# Patient Record
Sex: MALE | Race: WHITE | Employment: OTHER | URBAN - NONMETROPOLITAN AREA
[De-identification: names, ages, dates, MRNs, and addresses within clinical notes are randomized per-mention and may not be internally consistent; named-entity substitution may affect disease eponyms.]

---

## 2017-01-12 ENCOUNTER — OFFICE VISIT (OUTPATIENT)
Dept: PULMONOLOGY | Age: 62
End: 2017-01-12

## 2017-01-12 VITALS
HEART RATE: 73 BPM | BODY MASS INDEX: 37.65 KG/M2 | OXYGEN SATURATION: 93 % | HEIGHT: 74 IN | SYSTOLIC BLOOD PRESSURE: 130 MMHG | WEIGHT: 293.4 LBS | DIASTOLIC BLOOD PRESSURE: 80 MMHG

## 2017-01-12 DIAGNOSIS — Z99.89 OSA ON CPAP: Primary | ICD-10-CM

## 2017-01-12 DIAGNOSIS — G47.33 OSA ON CPAP: Primary | ICD-10-CM

## 2017-01-12 PROCEDURE — 99213 OFFICE O/P EST LOW 20 MIN: CPT | Performed by: INTERNAL MEDICINE

## 2017-02-14 ENCOUNTER — OFFICE VISIT (OUTPATIENT)
Dept: CARDIOLOGY | Age: 62
End: 2017-02-14

## 2017-02-14 VITALS
WEIGHT: 294.2 LBS | HEIGHT: 74 IN | BODY MASS INDEX: 37.76 KG/M2 | HEART RATE: 60 BPM | SYSTOLIC BLOOD PRESSURE: 150 MMHG | DIASTOLIC BLOOD PRESSURE: 88 MMHG

## 2017-02-14 DIAGNOSIS — Z86.79 S/P RF ABLATION OPERATION FOR ARRHYTHMIA: Primary | ICD-10-CM

## 2017-02-14 DIAGNOSIS — G47.33 OSA ON CPAP: ICD-10-CM

## 2017-02-14 DIAGNOSIS — Z98.890 S/P RF ABLATION OPERATION FOR ARRHYTHMIA: Primary | ICD-10-CM

## 2017-02-14 DIAGNOSIS — I42.9 CARDIOMYOPATHY (HCC): ICD-10-CM

## 2017-02-14 DIAGNOSIS — Z99.89 OSA ON CPAP: ICD-10-CM

## 2017-02-14 DIAGNOSIS — I10 ESSENTIAL HYPERTENSION: ICD-10-CM

## 2017-02-14 PROCEDURE — 99214 OFFICE O/P EST MOD 30 MIN: CPT | Performed by: INTERNAL MEDICINE

## 2017-02-14 RX ORDER — METOPROLOL SUCCINATE 50 MG/1
75 TABLET, EXTENDED RELEASE ORAL 2 TIMES DAILY
Qty: 90 TABLET | Refills: 6 | Status: SHIPPED | OUTPATIENT
Start: 2017-02-14 | End: 2017-12-13 | Stop reason: SDUPTHER

## 2017-02-14 RX ORDER — LISINOPRIL 5 MG/1
5 TABLET ORAL DAILY
Qty: 30 TABLET | Refills: 6 | Status: SHIPPED | OUTPATIENT
Start: 2017-02-14 | End: 2017-11-10 | Stop reason: SDUPTHER

## 2017-06-12 ENCOUNTER — OFFICE VISIT (OUTPATIENT)
Dept: CARDIOLOGY | Age: 62
End: 2017-06-12

## 2017-06-12 VITALS
HEART RATE: 72 BPM | BODY MASS INDEX: 36.63 KG/M2 | WEIGHT: 294.6 LBS | DIASTOLIC BLOOD PRESSURE: 68 MMHG | SYSTOLIC BLOOD PRESSURE: 112 MMHG | HEIGHT: 75 IN

## 2017-06-12 DIAGNOSIS — Z86.79 S/P RF ABLATION OPERATION FOR ARRHYTHMIA: ICD-10-CM

## 2017-06-12 DIAGNOSIS — Z86.79 S/P ABLATION OF ATRIAL FIBRILLATION: ICD-10-CM

## 2017-06-12 DIAGNOSIS — I48.19 PERSISTENT ATRIAL FIBRILLATION (HCC): ICD-10-CM

## 2017-06-12 DIAGNOSIS — Z98.890 S/P RF ABLATION OPERATION FOR ARRHYTHMIA: ICD-10-CM

## 2017-06-12 DIAGNOSIS — G47.33 OSA ON CPAP: ICD-10-CM

## 2017-06-12 DIAGNOSIS — Z98.890 S/P ABLATION OF ATRIAL FIBRILLATION: ICD-10-CM

## 2017-06-12 DIAGNOSIS — Z99.89 OSA ON CPAP: ICD-10-CM

## 2017-06-12 DIAGNOSIS — I10 ESSENTIAL HYPERTENSION: ICD-10-CM

## 2017-06-12 DIAGNOSIS — I42.9 CARDIOMYOPATHY (HCC): Primary | ICD-10-CM

## 2017-06-12 PROCEDURE — 93000 ELECTROCARDIOGRAM COMPLETE: CPT | Performed by: INTERNAL MEDICINE

## 2017-06-12 PROCEDURE — 99214 OFFICE O/P EST MOD 30 MIN: CPT | Performed by: INTERNAL MEDICINE

## 2017-06-12 RX ORDER — ASPIRIN 325 MG
325 TABLET, DELAYED RELEASE (ENTERIC COATED) ORAL EVERY 6 HOURS PRN
Qty: 30 TABLET | Refills: 3 | COMMUNITY
Start: 2017-06-12 | End: 2017-12-18 | Stop reason: SDUPTHER

## 2017-07-13 ENCOUNTER — OFFICE VISIT (OUTPATIENT)
Dept: PULMONOLOGY | Age: 62
End: 2017-07-13

## 2017-07-13 VITALS
OXYGEN SATURATION: 94 % | WEIGHT: 295.8 LBS | SYSTOLIC BLOOD PRESSURE: 122 MMHG | HEART RATE: 61 BPM | HEIGHT: 75 IN | BODY MASS INDEX: 36.78 KG/M2 | DIASTOLIC BLOOD PRESSURE: 82 MMHG

## 2017-07-13 DIAGNOSIS — G47.33 OSA ON CPAP: Primary | ICD-10-CM

## 2017-07-13 DIAGNOSIS — Z99.89 OSA ON CPAP: Primary | ICD-10-CM

## 2017-07-13 PROCEDURE — 99213 OFFICE O/P EST LOW 20 MIN: CPT | Performed by: PHYSICIAN ASSISTANT

## 2017-11-10 RX ORDER — LISINOPRIL 5 MG/1
5 TABLET ORAL DAILY
Qty: 30 TABLET | Refills: 6 | Status: SHIPPED | OUTPATIENT
Start: 2017-11-10 | End: 2017-12-18 | Stop reason: SDUPTHER

## 2017-12-14 RX ORDER — METOPROLOL SUCCINATE 50 MG/1
75 TABLET, EXTENDED RELEASE ORAL 2 TIMES DAILY
Qty: 90 TABLET | Refills: 0 | Status: SHIPPED | OUTPATIENT
Start: 2017-12-14 | End: 2017-12-18 | Stop reason: SDUPTHER

## 2017-12-18 ENCOUNTER — OFFICE VISIT (OUTPATIENT)
Dept: CARDIOLOGY CLINIC | Age: 62
End: 2017-12-18
Payer: COMMERCIAL

## 2017-12-18 VITALS
HEIGHT: 75 IN | BODY MASS INDEX: 38.02 KG/M2 | SYSTOLIC BLOOD PRESSURE: 140 MMHG | WEIGHT: 305.8 LBS | DIASTOLIC BLOOD PRESSURE: 88 MMHG | HEART RATE: 68 BPM

## 2017-12-18 DIAGNOSIS — I10 ESSENTIAL HYPERTENSION: ICD-10-CM

## 2017-12-18 DIAGNOSIS — I42.0 DILATED CARDIOMYOPATHY (HCC): Primary | ICD-10-CM

## 2017-12-18 DIAGNOSIS — Z86.79 S/P ABLATION OF ATRIAL FIBRILLATION: ICD-10-CM

## 2017-12-18 DIAGNOSIS — G47.33 OSA ON CPAP: ICD-10-CM

## 2017-12-18 DIAGNOSIS — Z98.890 S/P ABLATION OF ATRIAL FIBRILLATION: ICD-10-CM

## 2017-12-18 DIAGNOSIS — I48.19 PERSISTENT ATRIAL FIBRILLATION (HCC): ICD-10-CM

## 2017-12-18 DIAGNOSIS — Z99.89 OSA ON CPAP: ICD-10-CM

## 2017-12-18 PROCEDURE — 99214 OFFICE O/P EST MOD 30 MIN: CPT | Performed by: INTERNAL MEDICINE

## 2017-12-18 RX ORDER — ASPIRIN 325 MG
325 TABLET, DELAYED RELEASE (ENTERIC COATED) ORAL DAILY
Qty: 90 TABLET | Refills: 1 | Status: SHIPPED | OUTPATIENT
Start: 2017-12-18 | End: 2021-10-13 | Stop reason: SDUPTHER

## 2017-12-18 RX ORDER — METOPROLOL SUCCINATE 50 MG/1
75 TABLET, EXTENDED RELEASE ORAL 2 TIMES DAILY
Qty: 270 TABLET | Refills: 1 | Status: SHIPPED | OUTPATIENT
Start: 2017-12-18 | End: 2018-08-09 | Stop reason: SDUPTHER

## 2017-12-18 RX ORDER — LISINOPRIL 5 MG/1
5 TABLET ORAL DAILY
Qty: 90 TABLET | Refills: 1 | Status: SHIPPED | OUTPATIENT
Start: 2017-12-18 | End: 2018-09-28 | Stop reason: SDUPTHER

## 2017-12-18 NOTE — PROGRESS NOTES
swelling  Extremities - peripheral pulses normal, no pedal edema, no clubbing or cyanosis  Skin - normal coloration and turgor, no rashes, no suspicious skin lesions noted    Lab  No results for input(s): CKTOTAL, CKMB, CKMBINDEX, TROPONINI in the last 72 hours. CBC:   Lab Results   Component Value Date    WBC 9.9 02/26/2016    RBC 5.59 02/26/2016    HGB 17.1 02/26/2016    HCT 51.0 02/26/2016    MCV 91.3 02/26/2016    MCH 30.5 02/26/2016    MCHC 33.4 02/26/2016    RDW 12.9 02/26/2016     02/26/2016    MPV 10.1 02/26/2016     BMP:    Lab Results   Component Value Date     02/26/2016    K 5.3 02/26/2016     02/26/2016    CO2 27 02/26/2016    BUN 19 02/26/2016    CREATININE 1.0 02/26/2016    CALCIUM 9.4 02/26/2016    LABGLOM 76 02/26/2016    GLUCOSE 99 02/26/2016     Hepatic Function Panel:  No results found for: ALKPHOS, ALT, AST, PROT, BILITOT, BILIDIR, IBILI, LABALBU  Magnesium:    Lab Results   Component Value Date    MG 2.0 02/18/2016     Warfarin PT/INR:  No components found for: PTPATWAR, PTINRWAR  HgBA1c:  No results found for: LABA1C  FLP:  No results found for: TRIG, HDL, LDLCALC, LDLDIRECT, LABVLDL  TSH:    Lab Results   Component Value Date    TSH 1.680 02/16/2016     ekg 8/2/16  Sinus  Rhythm   -RSR(V1) -nondiagnostic.    -Old anterior infarct. ABNORMAL     EKG NSR no acute changes    Event monitor from 08/15/2016 - 09/13/2016   The patient's monitoring period was 08/15/2016 - 09/13/2016. Baseline sample showed Sinus Bradycardia w/Artifact with a heart rate of 59.6 bpm. There  were 0 critical, 0 serious, and 5 stable events that occurred. The report analysis of the critical, serious, stable and manually triggered events are listed  below.   Manually Detected Events:  1 Stable: Sinus Bradycardia w/Artifact   No Symptom or Accidental Push  1 Stable: Sinus Rhythm w/Artifact/Lead Loss   Asymptomatic/Work/Standing No Symptom or Accidental  Push  Automatically Detected Events:  1 Stable: Bradyarrhythmia  1 Stable: Sinus Arrhythmia w/Artifact  1 Stable: Sinus Bradycardia w/Bigeminal PACs        Assessment    1. Dilated cardiomyopathy ([address]     2. Essential hypertension     3. Persistent atrial fibrillation (HCC) s/p two CV and following successful CV pat aways went back to afib     4. S/P ablation of atrial fibrillation     5. MARGE on CPAP         S/p MARINA-CV- successful but few hours later went back to afib with CVR  New onset AFB with RVR of less than 24 hours time at present  AFB noted yesterday with surgery    CMP EF 40% - probably Tachy mediated and neg Nuc stress    CHADS2= 1 new HTN    HTN- new diagnosis    S\p surgery for smash injury to middle finge    DJD- chronic neck pain      Plan   Continue the current treatment and with constant vigilance to changes in symptoms and also any potential side effects. Return for care or seek medical attention immediately if symptoms got worse and/or develop new symptoms. S/p successful- RF ablation of the afib  Event monitor in sept 2016 no afib noted  \". Carlotta recommended- Continue on eliquis for now since he has Risk factors for embolism ([id number] score) include: congestive heart failure and hypertension. If his heart EF improve then we will consider to stop eliquis and increase aspirin to 81 mg po 2 a day\"  - Continue on C-PAP evaluation and treatment  Pat remain in NSR for almost a yrs post rf ablation  EF normalized  Stop  apixaban after completed what he had at home  Cont  asa to 325 po qd  D/w the pat at length    Cardiomyopathy: improving, no CHF symptoms, no change in clinical condition. Will need periodic echocardiograms depending on symptoms. Cont  toprol xl 75 bid  Cont lisinopril 5 mg po qd  off  amiodaron after taking it for 1 month post afib ablation  Stop apixaban 5 bid  Echo for EF  Improved to 55%    Hypertension, on medical treatment. Seems to be under good control. Patient is compliant with medical treatment.      Reviewed [facility] note from oct 2016    Recommended to have home sleep monitor then sleep study- to see pulm    RTC in 9 months    [facility] hospital

## 2018-07-03 DIAGNOSIS — I10 ESSENTIAL HYPERTENSION: ICD-10-CM

## 2018-07-03 DIAGNOSIS — I48.91 ATRIAL FIBRILLATION WITH RAPID VENTRICULAR RESPONSE (HCC): Primary | ICD-10-CM

## 2018-07-03 DIAGNOSIS — I48.19 PERSISTENT ATRIAL FIBRILLATION (HCC): ICD-10-CM

## 2018-07-03 RX ORDER — LISINOPRIL 5 MG/1
TABLET ORAL
Qty: 30 TABLET | Refills: 2 | Status: SHIPPED | OUTPATIENT
Start: 2018-07-03 | End: 2018-07-19 | Stop reason: ALTCHOICE

## 2018-07-03 NOTE — TELEPHONE ENCOUNTER
Severiano Chilel called requesting a refill on the following medications:  Requested Prescriptions     Pending Prescriptions Disp Refills    lisinopril (PRINIVIL;ZESTRIL) 5 MG tablet [Pharmacy Med Name: LISINOPRIL 5 MG TABLET] 30 tablet 6     Sig: take 1 tablet by mouth once daily     Pharmacy verified:  .pv  [facility] on [address]    Date of last visit: 12/18/17  Date of next visit (if applicable): 6/26/3644

## 2018-07-19 ENCOUNTER — OFFICE VISIT (OUTPATIENT)
Dept: PULMONOLOGY | Age: 63
End: 2018-07-19
Payer: COMMERCIAL

## 2018-07-19 VITALS
DIASTOLIC BLOOD PRESSURE: 88 MMHG | HEIGHT: 75 IN | OXYGEN SATURATION: 96 % | HEART RATE: 61 BPM | WEIGHT: 301 LBS | BODY MASS INDEX: 37.42 KG/M2 | SYSTOLIC BLOOD PRESSURE: 128 MMHG

## 2018-07-19 DIAGNOSIS — G47.33 OSA ON CPAP: Primary | ICD-10-CM

## 2018-07-19 DIAGNOSIS — Z99.89 OSA ON CPAP: Primary | ICD-10-CM

## 2018-07-19 PROCEDURE — 99213 OFFICE O/P EST LOW 20 MIN: CPT | Performed by: PHYSICIAN ASSISTANT

## 2018-07-19 ASSESSMENT — ENCOUNTER SYMPTOMS
EYES NEGATIVE: 1
ORTHOPNEA: 0
RESPIRATORY NEGATIVE: 1
WHEEZING: 0
NAUSEA: 0
SINUS PAIN: 0
SORE THROAT: 0
COUGH: 0
GASTROINTESTINAL NEGATIVE: 1
HEARTBURN: 0
SPUTUM PRODUCTION: 0
SHORTNESS OF BREATH: 0

## 2018-07-19 NOTE — PROGRESS NOTES
[place] [facility]      [facility]                                         [id number]  7/19/2018   Chief Complaint   Patient presents with    Sleep Apnea     MARGE 12 mo f/u Susan        Pt of Dr. Libby MOSS Download:   Original or initial  AHI: 19.2     Date of initial study: 10/4/16    [x] Compliant  100%   []  Noncompliant 0 %     PAP Type CPAP   Level  10   Avg Hrs/Day 9:01  AHI: 3.6   Recorded compliance dates , Germaine 3, 2018  to July 2, 2018   Machine/Mfg: Respironics Interface: FFM    Provider:  []SR-HME  []Campos  [x]Susan  [][facility]         [][facility] []Other:   Neck Size: 17.25  Mallampati Mallampati 4  ESS:  1    Here is a scan of the most recent download:                Presentation:   Karen Kaye presents for sleep medicine follow up for obstructive sleep apnea  Since the last visit, Karen Kaye is doing reasonably well with their sleep machine. Other comments: He is doing well with PAP. He has a leak in his hose and has not replaced it yet. Equipment issues: The pressure is  acceptable, the mask is acceptable and he  is  using the humidity. Sleep issues:  Do you feel better? Yes  More rested? Yes   Better concentration? yes    Progress History:   Since last visit any new medical issues? No  New ER or hospitlal visits? No  Any new or changes in medicines? No  Any new sleep medicines?  No        Past Medical History:   Diagnosis Date    Atrial fibrillation with RVR ([address] 2/15/15    Successful cardioversion on 2/17/16, reverted back to A. fib after stress test    Chronic systolic congestive heart failure ([address] 2/15/15     Ejection fraction of 40%    DJD (degenerative joint disease) of knee     Essential hypertension     Fracture of finger, middle phalanx, left, open 2/15/15    Finger got stuck between the log and door    Sleep apnea        Past Surgical History:   Procedure Laterality Date    ATRIAL ABLATION SURGERY  07/01/2016    ablation with Dr. Rajwinder Robledo on

## 2018-08-10 RX ORDER — METOPROLOL SUCCINATE 50 MG/1
TABLET, EXTENDED RELEASE ORAL
Qty: 270 TABLET | Refills: 0 | Status: SHIPPED | OUTPATIENT
Start: 2018-08-10 | End: 2018-09-28 | Stop reason: SDUPTHER

## 2018-09-26 ENCOUNTER — HOSPITAL ENCOUNTER (OUTPATIENT)
Age: 63
Discharge: HOME | End: 2018-09-26
Payer: COMMERCIAL

## 2018-09-26 DIAGNOSIS — I48.19 PERSISTENT ATRIAL FIBRILLATION (HCC): ICD-10-CM

## 2018-09-26 DIAGNOSIS — I48.91 ATRIAL FIBRILLATION WITH RAPID VENTRICULAR RESPONSE (HCC): ICD-10-CM

## 2018-09-26 DIAGNOSIS — I10 ESSENTIAL HYPERTENSION: ICD-10-CM

## 2018-09-26 LAB
ANION GAP SERPL CALCULATED.3IONS-SCNC: 16 MEQ/L (ref 8–16)
BUN BLDV-MCNC: 16 MG/DL (ref 7–22)
CALCIUM SERPL-MCNC: 9.1 MG/DL (ref 8.5–10.5)
CHLORIDE BLD-SCNC: 102 MEQ/L (ref 98–111)
CO2: 22 MEQ/L (ref 23–33)
CREAT SERPL-MCNC: 0.8 MG/DL (ref 0.4–1.2)
GFR SERPL CREATININE-BSD FRML MDRD: > 90 ML/MIN/1.73M2
GLUCOSE BLD-MCNC: 108 MG/DL (ref 70–108)
POTASSIUM SERPL-SCNC: 4.7 MEQ/L (ref 3.5–5.2)
SODIUM BLD-SCNC: 140 MEQ/L (ref 135–145)

## 2018-09-26 PROCEDURE — 36415 COLL VENOUS BLD VENIPUNCTURE: CPT

## 2018-09-26 PROCEDURE — 80048 BASIC METABOLIC PNL TOTAL CA: CPT

## 2018-09-28 ENCOUNTER — OFFICE VISIT (OUTPATIENT)
Dept: CARDIOLOGY CLINIC | Age: 63
End: 2018-09-28
Payer: COMMERCIAL

## 2018-09-28 VITALS
BODY MASS INDEX: 36.85 KG/M2 | HEIGHT: 76 IN | SYSTOLIC BLOOD PRESSURE: 132 MMHG | HEART RATE: 60 BPM | WEIGHT: 302.6 LBS | DIASTOLIC BLOOD PRESSURE: 74 MMHG

## 2018-09-28 DIAGNOSIS — I10 ESSENTIAL HYPERTENSION: ICD-10-CM

## 2018-09-28 DIAGNOSIS — I48.19 PERSISTENT ATRIAL FIBRILLATION (HCC): Primary | ICD-10-CM

## 2018-09-28 DIAGNOSIS — Z98.890 S/P ABLATION OF ATRIAL FIBRILLATION: ICD-10-CM

## 2018-09-28 DIAGNOSIS — I42.0 DILATED CARDIOMYOPATHY (HCC): ICD-10-CM

## 2018-09-28 DIAGNOSIS — Z86.79 S/P ABLATION OF ATRIAL FIBRILLATION: ICD-10-CM

## 2018-09-28 PROCEDURE — 93000 ELECTROCARDIOGRAM COMPLETE: CPT | Performed by: INTERNAL MEDICINE

## 2018-09-28 PROCEDURE — 99214 OFFICE O/P EST MOD 30 MIN: CPT | Performed by: INTERNAL MEDICINE

## 2018-09-28 RX ORDER — METOPROLOL SUCCINATE 50 MG/1
TABLET, EXTENDED RELEASE ORAL
Qty: 270 TABLET | Refills: 4 | Status: SHIPPED | OUTPATIENT
Start: 2018-09-28 | End: 2019-10-04 | Stop reason: SDUPTHER

## 2018-09-28 RX ORDER — LISINOPRIL 5 MG/1
5 TABLET ORAL DAILY
Qty: 90 TABLET | Refills: 4 | Status: SHIPPED | OUTPATIENT
Start: 2018-09-28 | End: 2019-10-04 | Stop reason: SDUPTHER

## 2018-09-28 NOTE — PROGRESS NOTES
Accidental  Push  Automatically Detected Events:  1 Stable: Bradyarrhythmia  1 Stable: Sinus Arrhythmia w/Artifact  1 Stable: Sinus Bradycardia w/Bigeminal PACs    EKG 9/28/18  Sinus  Rhythm   WITHIN NORMAL LIMITS    Assessment     Diagnosis Orders   1. Persistent atrial fibrillation (HCC) s/p two CV and following successful CV pat aways went back to afib     2. Dilated cardiomyopathy ([address]     3. Essential hypertension     4. S/P ablation of atrial fibrillation         S/p MARINA-CV- successful but few hours later went back to afib with CVR  New onset AFB with RVR of less than 24 hours time at present  AFB noted yesterday with surgery    CMP EF 40% - probably Tachy mediated and neg Nuc stress    CHADS2= 1 new HTN    HTN- new diagnosis    S\p surgery for smash injury to middle finge    DJD- chronic neck pain      Plan   The current meds and labs reviewed    Continue the current treatment and with constant vigilance to changes in symptoms and also any potential side effects. Return for care or seek medical attention immediately if symptoms got worse and/or develop new symptoms. S/p successful- RF ablation of the afib  Event monitor in sept 2016 no afib noted  \". Carlotta recommended- Continue on eliquis for now since he has Risk factors for embolism ([id number] score) include: congestive heart failure and hypertension. If his heart EF improve then we will consider to stop eliquis and increase aspirin to 81 mg po 2 a day\"  - Continue on C-PAP evaluation and treatment  Pat remain in NSR for almost a yrs post rf ablation  EF normalized  Off   apixaban after completed what he had at home  Cont  asa to 325 po qd  D/w the pat at length    Cardiomyopathy: improving, no CHF symptoms, no change in clinical condition. Will need periodic echocardiograms depending on symptoms.   Cont  toprol xl 75 bid  Cont lisinopril 5 mg po qd  off  amiodaron after taking it for 1 month post afib ablation  Off  apixaban 5 bid  Echo for EF

## 2019-07-22 ENCOUNTER — OFFICE VISIT (OUTPATIENT)
Dept: PULMONOLOGY | Age: 64
End: 2019-07-22
Payer: COMMERCIAL

## 2019-07-22 VITALS
HEART RATE: 54 BPM | SYSTOLIC BLOOD PRESSURE: 120 MMHG | BODY MASS INDEX: 36.83 KG/M2 | OXYGEN SATURATION: 91 % | HEIGHT: 76 IN | DIASTOLIC BLOOD PRESSURE: 74 MMHG | WEIGHT: 302.4 LBS

## 2019-07-22 DIAGNOSIS — Z72.0 TOBACCO ABUSE: ICD-10-CM

## 2019-07-22 DIAGNOSIS — Z99.89 OSA ON CPAP: Primary | ICD-10-CM

## 2019-07-22 DIAGNOSIS — G47.33 OSA ON CPAP: Primary | ICD-10-CM

## 2019-07-22 DIAGNOSIS — E66.9 OBESITY (BMI 30-39.9): ICD-10-CM

## 2019-07-22 PROCEDURE — 99213 OFFICE O/P EST LOW 20 MIN: CPT | Performed by: PHYSICIAN ASSISTANT

## 2019-07-22 ASSESSMENT — ENCOUNTER SYMPTOMS
COUGH: 0
EYES NEGATIVE: 1
BACK PAIN: 0
SHORTNESS OF BREATH: 0
NAUSEA: 0
STRIDOR: 0
ALLERGIC/IMMUNOLOGIC NEGATIVE: 1
DIARRHEA: 0
CHEST TIGHTNESS: 0
WHEEZING: 0

## 2019-10-04 ENCOUNTER — OFFICE VISIT (OUTPATIENT)
Dept: CARDIOLOGY CLINIC | Age: 64
End: 2019-10-04
Payer: COMMERCIAL

## 2019-10-04 VITALS
HEIGHT: 75 IN | HEART RATE: 60 BPM | WEIGHT: 303.8 LBS | DIASTOLIC BLOOD PRESSURE: 87 MMHG | SYSTOLIC BLOOD PRESSURE: 139 MMHG | BODY MASS INDEX: 37.77 KG/M2

## 2019-10-04 DIAGNOSIS — I42.0 DILATED CARDIOMYOPATHY (HCC): Primary | ICD-10-CM

## 2019-10-04 DIAGNOSIS — I10 ESSENTIAL HYPERTENSION: ICD-10-CM

## 2019-10-04 DIAGNOSIS — Z86.79 S/P ABLATION OF ATRIAL FIBRILLATION: ICD-10-CM

## 2019-10-04 DIAGNOSIS — I48.91 ATRIAL FIBRILLATION WITH RAPID VENTRICULAR RESPONSE (HCC): Primary | ICD-10-CM

## 2019-10-04 DIAGNOSIS — Z98.890 S/P ABLATION OF ATRIAL FIBRILLATION: ICD-10-CM

## 2019-10-04 PROCEDURE — 99214 OFFICE O/P EST MOD 30 MIN: CPT | Performed by: INTERNAL MEDICINE

## 2019-10-04 PROCEDURE — 93000 ELECTROCARDIOGRAM COMPLETE: CPT | Performed by: INTERNAL MEDICINE

## 2019-10-04 RX ORDER — METOPROLOL SUCCINATE 50 MG/1
TABLET, EXTENDED RELEASE ORAL
Qty: 270 TABLET | Refills: 4 | Status: SHIPPED | OUTPATIENT
Start: 2019-10-04 | End: 2020-10-09 | Stop reason: SDUPTHER

## 2019-10-04 RX ORDER — LISINOPRIL 5 MG/1
5 TABLET ORAL DAILY
Qty: 90 TABLET | Refills: 4 | Status: SHIPPED | OUTPATIENT
Start: 2019-10-04 | End: 2020-10-09 | Stop reason: SDUPTHER

## 2020-07-27 ENCOUNTER — OFFICE VISIT (OUTPATIENT)
Dept: PULMONOLOGY | Age: 65
End: 2020-07-27
Payer: COMMERCIAL

## 2020-07-27 VITALS
SYSTOLIC BLOOD PRESSURE: 138 MMHG | WEIGHT: 301 LBS | HEART RATE: 70 BPM | OXYGEN SATURATION: 96 % | BODY MASS INDEX: 38.63 KG/M2 | HEIGHT: 74 IN | TEMPERATURE: 98.6 F | DIASTOLIC BLOOD PRESSURE: 88 MMHG

## 2020-07-27 PROCEDURE — 99213 OFFICE O/P EST LOW 20 MIN: CPT | Performed by: PHYSICIAN ASSISTANT

## 2020-07-27 ASSESSMENT — ENCOUNTER SYMPTOMS
DIARRHEA: 0
CHEST TIGHTNESS: 0
NAUSEA: 0
ALLERGIC/IMMUNOLOGIC NEGATIVE: 1
COUGH: 0
SHORTNESS OF BREATH: 0
BACK PAIN: 0
STRIDOR: 0
WHEEZING: 0
EYES NEGATIVE: 1

## 2020-07-27 NOTE — PROGRESS NOTES
[place] [facility]      Shavonne Singh         [id number]  7/27/2020   Chief Complaint   Patient presents with    Follow-up     sleep f/u, last seen-        Pt of Dr. Edwin Lujan     PAP Download:   Elizabeth Corbin or initial AHI: 19.2     Date of initial study: 10/4/16      Compliant  100%     Noncompliant 0 %     PAP Type Dreamstation Auto Level  [id number]   Avg Hrs/Day 9hrs 43min   AHI: 2.7   Recorded compliance dates ,6/23/20-7/22/20  Machine/Mfg:   [] ResMed    [x] Respironics/Dreamstation   Interface:   [] Nasal    [] Nasal pillows   [x] FFM      Provider:      [] -HME     []Campos     [x] Susan    [] Guru Martin    [] [place]               [] [facility]      [] [employer]    [] [address]      [] Other    Neck Size: 17.25  Mallampati Mallampati 4  ESS:  1     Here is a scan of the most recent download:            Presentation:   Hossein Mercado presents for sleep medicine follow up for obstructive sleep apnea  Since the last visit, Hossein Mercado is doing well with PAP. He is sleeping well and feels rested. Mask is fitting well. Equipment issues: The pressure is  acceptable, the mask is acceptable     Sleep issues:  Do you feel better? Yes  More rested? Yes   Better concentration? yes    Progress History:   Since last visit any new medical issues? No  New ER or hospitlal visits? No  Any new or changes in medicines? No  Any new sleep medicines?  No        Past Medical History:   Diagnosis Date    Atrial fibrillation with RVR ([address] 2/15/15    Successful cardioversion on 2/17/16, reverted back to A. fib after stress test    Chronic systolic congestive heart failure ([address] 2/15/15     Ejection fraction of 40%    DJD (degenerative joint disease) of knee     Essential hypertension     Fracture of finger, middle phalanx, left, open 2/15/15    Finger got stuck between the log and door    Sleep apnea        Past Surgical History:   Procedure Laterality Date    ATRIAL ABLATION SURGERY  07/01/2016 ablation with Dr. Niecy Hitchcock on 7-1-16.  JOINT REPLACEMENT Right 2014    KNEE    KNEE ARTHROSCOPY Right 1980s    WRIST SURGERY Left 1980s       Social History     Tobacco Use    Smoking status: Current Every Day Smoker     Packs/day: 1.00     Years: 47.00     Pack years: 47.00     Types: Cigarettes     Start date: 9/9/1972    Smokeless tobacco: Former User   Substance Use Topics    Alcohol use: Yes     Comment: OCC    Drug use: No       No Known Allergies    Current Outpatient Medications   Medication Sig Dispense Refill    metoprolol succinate (TOPROL XL) 50 MG extended release tablet take 1 and 1/2 tablets by mouth twice a day 270 tablet 4    lisinopril (PRINIVIL;ZESTRIL) 5 MG tablet Take 1 tablet by mouth daily 90 tablet 4    aspirin 325 MG EC tablet Take 1 tablet by mouth daily 90 tablet 1     No current facility-administered medications for this visit. Family History   Problem Relation Age of Onset   [facility] Cancer Mother         lung    Cancer Father         unknown    Heart Disease Brother         Review of Systems -   Review of Systems   Constitutional: Negative for activity change, appetite change, chills and fever. HENT: Negative for congestion and postnasal drip. Eyes: Negative. Respiratory: Negative for cough, chest tightness, shortness of breath, wheezing and stridor. Cardiovascular: Negative for chest pain and leg swelling. Gastrointestinal: Negative for diarrhea and nausea. Endocrine: Negative. Genitourinary: Negative. Musculoskeletal: Negative. Negative for arthralgias and back pain. Skin: Negative. Allergic/Immunologic: Negative. Neurological: Negative. Negative for dizziness and light-headedness. Psychiatric/Behavioral: Negative. All other systems reviewed and are negative. Physical Exam:    BMI:  Body mass index is 38.65 kg/m².     Wt Readings from Last 3 Encounters:   07/27/20 (!) 301 lb (136.5 kg)   10/04/19 (!) 303 lb 12.8 oz (137.8 kg) 07/22/19 (!) 302 lb 6.4 oz (137.2 kg)     Weight stable / unchanged  Vitals: /88 (Site: Left Upper Arm, Position: Sitting, Cuff Size: Medium Adult)   Pulse 70   Temp 98.6 °F (37 °C)   Ht 6' 2\" (1.88 m)   Wt (!) 301 lb (136.5 kg)   SpO2 96% Comment: on RA  BMI 38.65 kg/m²       Physical Exam  Constitutional:       Appearance: Normal appearance. He is normal weight. HENT:      Head: Normocephalic and atraumatic. Right Ear: External ear normal.      Left Ear: External ear normal.      Nose: Nose normal.   Eyes:      Extraocular Movements: Extraocular movements intact. Conjunctiva/sclera: Conjunctivae normal.      Pupils: Pupils are equal, round, and reactive to light. Neck:      Musculoskeletal: Normal range of motion and neck supple. Pulmonary:      Effort: Pulmonary effort is normal.   Neurological:      General: No focal deficit present. Mental Status: He is alert and oriented to person, place, and time. Psychiatric:         Attention and Perception: Attention and perception normal.         Mood and Affect: Mood and affect normal.         Speech: Speech normal.         Behavior: Behavior normal. Behavior is cooperative. Thought Content: Thought content normal.         Cognition and Memory: Cognition normal.         Judgment: Judgment normal.           ASSESSMENT/DIAGNOSIS     Diagnosis Orders   1. MARGE on CPAP     2. Obesity (BMI 30-39. 9)              Plan   Do you need any equipment today? Yes    - He  was advised to continue current positive airway pressure therapy with above described pressure.    - He  advised to keep good compliance with current recommended pressure to get optimal results and clinical improvement  - Recommend 7-9 hours of sleep with PAP  - He was advised to call mohchi regarding supplies if needed.   -He call my office for earlier appointment if needed for worsening of sleep symptoms.   - He was instructed on weight loss  - Joey Sor was educated about my impression and plan. Patient verbalizesunderstanding.   We will see Sawyer Mancilla back in: 1 year with download    Information added by my medical assistant/LPN was reviewed today         Staci Inman PA-C, MPAS  7/27/2020

## 2020-07-27 NOTE — PATIENT INSTRUCTIONS
Patient Education        Stopping Smoking: Care Instructions  Your Care Instructions     Cigarette smokers crave the nicotine in cigarettes. Giving it up is much harder than simply changing a habit. Your body has to stop craving the nicotine. It is hard to quit, but you can do it. There are many tools that people use to quit smoking. You may find that combining tools works best for you. There are several steps to quitting. First you get ready to quit. Then you get support to help you. After that, you learn new skills and behaviors to become a nonsmoker. For many people, a necessary step is getting and using medicine. Your doctor will help you set up the plan that best meets your needs. You may want to attend a smoking cessation program to help you quit smoking. When you choose a program, look for one that has proven success. Ask your doctor for ideas. You will greatly increase your chances of success if you take medicine as well as get counseling or join a cessation program.  Some of the changes you feel when you first quit tobacco are uncomfortable. Your body will miss the nicotine at first, and you may feel short-tempered and grumpy. You may have trouble sleeping or concentrating. Medicine can help you deal with these symptoms. You may struggle with changing your smoking habits and rituals. The last step is the tricky one: Be prepared for the smoking urge to continue for a time. This is a lot to deal with, but keep at it. You will feel better. Follow-up care is a key part of your treatment and safety. Be sure to make and go to all appointments, and call your doctor if you are having problems. It's also a good idea to know your test results and keep a list of the medicines you take. How can you care for yourself at home? · Ask your family, friends, and coworkers for support. You have a better chance of quitting if you have help and support.   · Join a support group, such as Nicotine Anonymous, for people who are trying to quit smoking. · Consider signing up for a smoking cessation program, such as the American Lung Association's Freedom from Smoking program.  · Get text messaging support. Go to the website at www.smokefree. gov to sign up for the [facility] program.  · Set a quit date. Pick your date carefully so that it is not right in the middle of a big deadline or stressful time. Once you quit, do not even take a puff. Get rid of all ashtrays and lighters after your last cigarette. Clean your house and your clothes so that they do not smell of smoke. · Learn how to be a nonsmoker. Think about ways you can avoid those things that make you reach for a cigarette. ? Avoid situations that put you at greatest risk for smoking. For some people, it is hard to have a drink with friends without smoking. For others, they might skip a coffee break with coworkers who smoke. ? Change your daily routine. Take a different route to work or eat a meal in a different place. · Cut down on stress. Calm yourself or release tension by doing an activity you enjoy, such as reading a book, taking a hot bath, or gardening. · Talk to your doctor or pharmacist about nicotine replacement therapy, which replaces the nicotine in your body. You still get nicotine but you do not use tobacco. Nicotine replacement products help you slowly reduce the amount of nicotine you need. These products come in several forms, many of them available over-the-counter:  ? Nicotine patches  ? Nicotine gum and lozenges  ? Nicotine inhaler  · Ask your doctor about bupropion (Wellbutrin) or varenicline (Chantix), which are prescription medicines. They do not contain nicotine. They help you by reducing withdrawal symptoms, such as stress and anxiety. · Some people find hypnosis, acupuncture, and massage helpful for ending the smoking habit. · Eat a healthy diet and get regular exercise.  Having healthy habits will help your body move past its craving for nicotine. · Be prepared to keep trying. Most people are not successful the first few times they try to quit. Do not get mad at yourself if you smoke again. Make a list of things you learned and think about when you want to try again, such as next week, next month, or next year. Where can you learn more? Go to https://Celatonpeedilmaewbatsheva.Fanhuan.com. org and sign in to your [employer] account. Enter L546 in the [employer] box to learn more about \"Stopping Smoking: Care Instructions. \"     If you do not have an account, please click on the \"Sign Up Now\" link. Current as of: March 12, 2020               Content Version: 12.5  © 2006-2020 Healthwise[employer] Inc[employer]. Care instructions adapted under license by [facility] ([facility]). If you have questions about a medical condition or this instruction, always ask your healthcare professional. [address] any warranty or liability for your use of this information.

## 2020-10-09 ENCOUNTER — OFFICE VISIT (OUTPATIENT)
Dept: CARDIOLOGY CLINIC | Age: 65
End: 2020-10-09
Payer: COMMERCIAL

## 2020-10-09 VITALS
HEIGHT: 75 IN | BODY MASS INDEX: 38.1 KG/M2 | HEART RATE: 60 BPM | DIASTOLIC BLOOD PRESSURE: 80 MMHG | SYSTOLIC BLOOD PRESSURE: 130 MMHG | WEIGHT: 306.4 LBS

## 2020-10-09 PROCEDURE — 99214 OFFICE O/P EST MOD 30 MIN: CPT | Performed by: INTERNAL MEDICINE

## 2020-10-09 PROCEDURE — 93000 ELECTROCARDIOGRAM COMPLETE: CPT | Performed by: INTERNAL MEDICINE

## 2020-10-09 RX ORDER — METOPROLOL SUCCINATE 50 MG/1
TABLET, EXTENDED RELEASE ORAL
Qty: 270 TABLET | Refills: 3 | Status: SHIPPED | OUTPATIENT
Start: 2020-10-09 | End: 2021-09-27

## 2020-10-09 RX ORDER — LISINOPRIL 5 MG/1
5 TABLET ORAL DAILY
Qty: 90 TABLET | Refills: 3 | Status: SHIPPED | OUTPATIENT
Start: 2020-10-09 | End: 2021-09-27

## 2020-10-09 NOTE — PROGRESS NOTES
Chief Complaint   Patient presents with    1 Year Follow Up    Atrial Fibrillation    Check-Up    Cardiomyopathy       Originally Came in for afib with RVR  Had successful TEECV today but few hours later went back to afib with CVR    Then went back to afib while in hospital  We started amiodarone for 1 week and then we did Cardioversion and get to NSR    Today came for F/u  He is back to afib with CVR         Pt here for a 1 yr f/u    EKG done today    Denies chest pain, dizziness, sob and, palpitations     Would like to discuss stopping medications last note from Dr. Jadon Allen scanned in chart from October    Had  Ablation with Dr. Aleksandr Santos on 7-1-16. F/u appointment with Dr. Aleksandr Santos. Having Holter Monitor done before appointment and ECHO. Patient Seen, Chart, Consults notes, Labs, Radiology studies reviewed. Patient Active Problem List   Diagnosis    DJD (degenerative joint disease) of knee    Atrial fibrillation with rapid ventricular response (HCC)    Persistent atrial fibrillation ([address] s/p two CV and following successful CV pat aways went back to afib    Cardiomyopathy ([address] EF 40% and repeat echo 2/2017- EF 55%    Essential hypertension    S/P RF ablation operation for arrhythmia - atrial Fib July 1, 2016 successful    S/P ablation of atrial fibrillation    MARGE on CPAP    Obesity (BMI 30-39. 9)    Tobacco abuse       Past Surgical History:   Procedure Laterality Date    ATRIAL ABLATION SURGERY  07/01/2016    ablation with Dr. Joya Keen on 7-1-16.     JOINT REPLACEMENT Right 2014    KNEE    KNEE ARTHROSCOPY Right 1980s    WRIST SURGERY Left 1980s       No Known Allergies     Family History   Problem Relation Age of Onset    Cancer Mother         lung    Cancer Father         unknown    Heart Disease Brother         Social History     Socioeconomic History    Marital status:      Spouse name: Not on file    Number of children: Not on file    Years of education: Not on file wheezing  Cardiovascular ROS: no chest pain or dyspnea on exertion  Gastrointestinal ROS: negative  Genito-Urinary ROS: no dysuria, trouble voiding, or hematuria  Musculoskeletal ROS: negative  Neurological ROS: no TIA or stroke symptoms  Dermatological ROS: negative      Blood pressure 130/80, pulse 60, height 6' 3\" (1.905 m), weight (!) 306 lb 6.4 oz (139 kg). Physical Examination:    General appearance - alert, well appearing, and in no distress  Mental status - alert, oriented to person, place, and time  Neck - supple, no significant adenopathy, no JVD, or carotid bruits  Chest - clear to auscultation, no wheezes, rales or rhonchi, symmetric air entry  Heart - normal rate, regular rhythm, normal S1, S2, no murmurs, rubs, clicks or gallops  Abdomen - soft, nontender, nondistended, no masses or organomegaly  Neurological - alert, oriented, normal speech, no focal findings or movement disorder noted  Musculoskeletal - no joint tenderness, deformity or swelling  Extremities - peripheral pulses normal, no pedal edema, no clubbing or cyanosis  Skin - normal coloration and turgor, no rashes, no suspicious skin lesions noted    Lab  No results for input(s): CKTOTAL, CKMB, CKMBINDEX, TROPONINI in the last 72 hours.   CBC:   Lab Results   Component Value Date    WBC 9.9 02/26/2016    RBC 5.59 02/26/2016    HGB 17.1 02/26/2016    HCT 51.0 02/26/2016    MCV 91.3 02/26/2016    MCH 30.5 02/26/2016    MCHC 33.4 02/26/2016    RDW 12.9 02/26/2016     02/26/2016    MPV 10.1 02/26/2016     BMP:    Lab Results   Component Value Date     09/26/2018    K 4.7 09/26/2018     09/26/2018    CO2 22 09/26/2018    BUN 16 09/26/2018    CREATININE 0.8 09/26/2018    CALCIUM 9.1 09/26/2018    LABGLOM >90 09/26/2018    GLUCOSE 108 09/26/2018     Hepatic Function Panel:  No results found for: ALKPHOS, ALT, AST, PROT, BILITOT, BILIDIR, IBILI, LABALBU  Magnesium:    Lab Results   Component Value Date    MG 2.0 02/18/2016 Warfarin PT/INR:  No components found for: PTPATWAR, PTINRWAR  HgBA1c:  No results found for: LABA1C  FLP:  No results found for: TRIG, HDL, LDLCALC, LDLDIRECT, LABVLDL  TSH:    Lab Results   Component Value Date    TSH 1.680 02/16/2016     ekg 8/2/16  Sinus  Rhythm   -RSR(V1) -nondiagnostic.    -Old anterior infarct. ABNORMAL     EKG NSR no acute changes    Event monitor from 08/15/2016 - 09/13/2016   The patient's monitoring period was 08/15/2016 - 09/13/2016. Baseline sample showed Sinus Bradycardia w/Artifact with a heart rate of 59.6 bpm. There  were 0 critical, 0 serious, and 5 stable events that occurred. The report analysis of the critical, serious, stable and manually triggered events are listed  below. Manually Detected Events:  1 Stable: Sinus Bradycardia w/Artifact   No Symptom or Accidental Push  1 Stable: Sinus Rhythm w/Artifact/Lead Loss   Asymptomatic/Work/Standing No Symptom or Accidental  Push  Automatically Detected Events:  1 Stable: Bradyarrhythmia  1 Stable: Sinus Arrhythmia w/Artifact  1 Stable: Sinus Bradycardia w/Bigeminal PACs    EKG 9/28/18  Sinus  Rhythm   WITHIN NORMAL LIMITS    EKG 10/4/19  NSR, no abn, QTC WNL    ekg 10/09/2020  Sinus  Rhythm   WITHIN NORMAL LIMITS   msec      Assessment     Diagnosis Orders   1. Dilated cardiomyopathy (HCC)  EKG 12 Lead   2. Essential hypertension  EKG 12 Lead   3. S/P ablation of atrial fibrillation     4.  Tobacco abuse           Previous Admission DX    S/p MARINA-CV- successful but few hours later went back to afib with CVR  New onset AFB with RVR of less than 24 hours time at present  AFB noted yesterday with surgery    CMP EF 40% - probably Tachy mediated and neg Nuc stress    CHADS2= 1 new HTN    HTN- new diagnosis    S\p surgery for smash injury to middle finge    DJD- chronic neck pain      Plan   The  current meds and labs reviewed    Continue the current treatment and with constant vigilance to changes in symptoms and also any potential side effects. Return for care or seek medical attention immediately if symptoms got worse and/or develop new symptoms. S/p successful- RF ablation of the afib  Event monitor in sept 2016 no afib noted  \". Carlotta recommended- Continue on eliquis for now since he has Risk factors for embolism ([id number] score) include: congestive heart failure and hypertension. If his heart EF improve then we will consider to stop eliquis and increase aspirin to 81 mg po 2 a day\"  - Continue on C-PAP evaluation and treatment  Pat remain in NSR for almost 2 yrs post rf ablation done 2016  EF normalized  Off   apixaban for a while  Cont  asa to 325 po qd  Remain in NSR for over     Cardiomyopathy: improving, no CHF symptoms, no change in clinical condition. Will need periodic echocardiograms depending on symptoms. Cont  toprol xl 75 bid  Cont lisinopril 5 mg po qd  off  amiodaron after taking it for 1 month post afib ablation  Off  apixaban 5 bid  Echo for EF  Improved to 55%    Hypertension, on medical treatment. Seems to be under good control. Patient is compliant with medical treatment. Reviewed [facility] note from oct 2016    MARGE on CPAP    Doing well and stable   ramian in NSR    Discussed use, benefit, and side effects of prescribed medications. All patient questions answered. Pt voiced understanding. Instructed to continue current medications, diet and exercise. Continue risk factor modification and medical management. Patient agreed with treatment plan. Follow up as directed.       RTC in  1 year    Sandrita Brito

## 2021-07-26 NOTE — PROGRESS NOTES
[facility]      Carl Ontiveros         [id number]  7/27/2021   Chief Complaint   Patient presents with    Sleep Apnea     MARGE sleep follow up with Pap download         Pt of Dr. Omkar George    PAP Download:   Original or initial AHI: 19.2     Date of initial study: 10/4/16      Compliant  100%     Noncompliant  %     PAP Type  Cpap Level  10 cmH20   Avg Hrs/Day 9 hours 22 minutes   AHI: 2.2   Recorded compliance dates , 6/26/21  to 7/25/21   Machine/Mfg:   [] ResMed    [x] Respironics/Dreamstation   Interface:   [] Nasal    [] Nasal pillows   [x] FFM      Provider:      [] JEOVANY     []Campos     [x] Susan    [] David Betancourt    [] Mellissa               [] [facility]      [] Adaptive    [] [address]      [] Other    Neck Size: 17.25  Mallampati 4  ESS:   1  SAQLI: 98    Here is a scan of the most recent download:          Presentation:   Percell Goldberg presents for sleep medicine follow up for obstructive sleep apnea  Since the last visit, Percell Goldberg is doing well with PAP. He is sleeping well and feels rested. Mask is fitting well. Equipment issues: The pressure is  acceptable, the mask is acceptable     Sleep issues:  Do you feel better? Yes  More rested? Yes   Better concentration? yes    Progress History:   Since last visit any new medical issues? No  New ER or hospital visits? No  Any new or changes in medicines? No  Any new sleep medicines? No    Review of Systems -   Review of Systems   Constitutional: Negative for activity change, appetite change, chills and fever. HENT: Negative for congestion and postnasal drip. Eyes: Negative. Respiratory: Negative for cough, chest tightness, shortness of breath, wheezing and stridor. Cardiovascular: Negative for chest pain and leg swelling. Gastrointestinal: Negative for diarrhea and nausea. Endocrine: Negative. Genitourinary: Negative. Musculoskeletal: Negative. Negative for arthralgias and back pain.    Skin: Negative. Allergic/Immunologic: Negative. Neurological: Negative. Negative for dizziness and light-headedness. Psychiatric/Behavioral: Negative. All other systems reviewed and are negative. Physical Exam:    BMI:  Body mass index is 38.5 kg/m². Wt Readings from Last 3 Encounters:   07/27/21 (!) 308 lb (139.7 kg)   10/09/20 (!) 306 lb 6.4 oz (139 kg)   07/27/20 (!) 301 lb (136.5 kg)     Weight stable / unchanged  Vitals: /80 (Site: Left Upper Arm, Position: Sitting, Cuff Size: Large Adult)   Pulse 63   Temp 95.3 °F (35.2 °C) (Tympanic)   Ht 6' 3\" (1.905 m)   Wt (!) 308 lb (139.7 kg)   SpO2 96% Comment: room air  BMI 38.50 kg/m²       Physical Exam  Constitutional:       Appearance: He is well-developed. HENT:      Head: Normocephalic and atraumatic. Right Ear: External ear normal.      Left Ear: External ear normal.   Eyes:      Conjunctiva/sclera: Conjunctivae normal.      Pupils: Pupils are equal, round, and reactive to light. Cardiovascular:      Rate and Rhythm: Normal rate and regular rhythm. Heart sounds: Normal heart sounds. Pulmonary:      Effort: Pulmonary effort is normal.      Breath sounds: Normal breath sounds. Musculoskeletal:         General: Normal range of motion. Cervical back: Normal range of motion and neck supple. Skin:     General: Skin is warm and dry. Neurological:      Mental Status: He is alert and oriented to person, place, and time. Psychiatric:         Behavior: Behavior normal.         Thought Content: Thought content normal.         Judgment: Judgment normal.           ASSESSMENT/DIAGNOSIS     Diagnosis Orders   1. MARGE on CPAP     2. Obesity (BMI 30-39. 9)              Plan   Do you need any equipment today?   - Recall discussed with patient and the risk and benefits of continuing to use PAP were discussed. - Instructed to call DME for further instructions.   - Download reviewed and discussed with patient  - He  was advised to continue current positive airway pressure therapy with above described pressure. - He  advised to keep good compliance with current recommended pressure to get optimal results and clinical improvement  - Recommend 7-9 hours of sleep with PAP  - He was advised to call [employer] company regarding supplies if needed.   -He call my office for earlier appointment if needed for worsening of sleep symptoms.   - He was instructed on weight loss  - Baltazar Knox was educated about my impression and plan. Patient verbalizesunderstanding.   We will see Redgie Si back in: March with download for Medicare Face to face    Information added by my medical assistant/LPN was reviewed today          Juancho Todd PA-C, MPAS  7/27/2021

## 2021-07-27 ENCOUNTER — OFFICE VISIT (OUTPATIENT)
Dept: PULMONOLOGY | Age: 66
End: 2021-07-27
Payer: COMMERCIAL

## 2021-07-27 VITALS
DIASTOLIC BLOOD PRESSURE: 80 MMHG | WEIGHT: 308 LBS | TEMPERATURE: 95.3 F | OXYGEN SATURATION: 96 % | SYSTOLIC BLOOD PRESSURE: 134 MMHG | HEART RATE: 63 BPM | BODY MASS INDEX: 38.3 KG/M2 | HEIGHT: 75 IN

## 2021-07-27 DIAGNOSIS — G47.33 OSA ON CPAP: Primary | ICD-10-CM

## 2021-07-27 DIAGNOSIS — E66.9 OBESITY (BMI 30-39.9): ICD-10-CM

## 2021-07-27 DIAGNOSIS — Z99.89 OSA ON CPAP: Primary | ICD-10-CM

## 2021-07-27 PROCEDURE — 99213 OFFICE O/P EST LOW 20 MIN: CPT | Performed by: PHYSICIAN ASSISTANT

## 2021-07-27 ASSESSMENT — ENCOUNTER SYMPTOMS
WHEEZING: 0
DIARRHEA: 0
COUGH: 0
STRIDOR: 0
EYES NEGATIVE: 1
BACK PAIN: 0
CHEST TIGHTNESS: 0
ALLERGIC/IMMUNOLOGIC NEGATIVE: 1
SHORTNESS OF BREATH: 0
NAUSEA: 0

## 2021-09-28 RX ORDER — LISINOPRIL 5 MG/1
TABLET ORAL
Qty: 90 TABLET | Refills: 0 | Status: SHIPPED | OUTPATIENT
Start: 2021-09-28 | End: 2021-10-13 | Stop reason: SDUPTHER

## 2021-09-28 RX ORDER — METOPROLOL SUCCINATE 50 MG/1
TABLET, EXTENDED RELEASE ORAL
Qty: 270 TABLET | Refills: 0 | Status: SHIPPED | OUTPATIENT
Start: 2021-09-28 | End: 2021-10-13 | Stop reason: SDUPTHER

## 2021-10-13 ENCOUNTER — OFFICE VISIT (OUTPATIENT)
Dept: CARDIOLOGY CLINIC | Age: 66
End: 2021-10-13
Payer: COMMERCIAL

## 2021-10-13 VITALS
SYSTOLIC BLOOD PRESSURE: 124 MMHG | HEART RATE: 56 BPM | WEIGHT: 303.13 LBS | HEIGHT: 75 IN | DIASTOLIC BLOOD PRESSURE: 90 MMHG | BODY MASS INDEX: 37.69 KG/M2

## 2021-10-13 DIAGNOSIS — Z98.890 S/P RF ABLATION OPERATION FOR ARRHYTHMIA: ICD-10-CM

## 2021-10-13 DIAGNOSIS — I10 ESSENTIAL HYPERTENSION: Primary | ICD-10-CM

## 2021-10-13 DIAGNOSIS — I42.0 DILATED CARDIOMYOPATHY (HCC): ICD-10-CM

## 2021-10-13 DIAGNOSIS — Z72.0 TOBACCO ABUSE: ICD-10-CM

## 2021-10-13 DIAGNOSIS — Z86.79 S/P ABLATION OF ATRIAL FIBRILLATION: ICD-10-CM

## 2021-10-13 DIAGNOSIS — Z86.79 S/P RF ABLATION OPERATION FOR ARRHYTHMIA: ICD-10-CM

## 2021-10-13 DIAGNOSIS — Z98.890 S/P ABLATION OF ATRIAL FIBRILLATION: ICD-10-CM

## 2021-10-13 PROCEDURE — 99214 OFFICE O/P EST MOD 30 MIN: CPT | Performed by: INTERNAL MEDICINE

## 2021-10-13 PROCEDURE — 93000 ELECTROCARDIOGRAM COMPLETE: CPT | Performed by: INTERNAL MEDICINE

## 2021-10-13 RX ORDER — LISINOPRIL 5 MG/1
TABLET ORAL
Qty: 90 TABLET | Refills: 3 | Status: SHIPPED | OUTPATIENT
Start: 2021-10-13 | End: 2022-04-06 | Stop reason: SDUPTHER

## 2021-10-13 RX ORDER — ASPIRIN 325 MG
325 TABLET, DELAYED RELEASE (ENTERIC COATED) ORAL DAILY
Qty: 90 TABLET | Refills: 3 | Status: SHIPPED | OUTPATIENT
Start: 2021-10-13

## 2021-10-13 RX ORDER — METOPROLOL SUCCINATE 50 MG/1
75 TABLET, EXTENDED RELEASE ORAL 2 TIMES DAILY
Qty: 270 TABLET | Refills: 3 | Status: SHIPPED | OUTPATIENT
Start: 2021-10-13 | End: 2022-04-06 | Stop reason: SDUPTHER

## 2021-10-13 NOTE — PROGRESS NOTES
Chief Complaint   Patient presents with   [facility] Cardiomyopathy       Originally Came in for afib with RVR  Had successful TEECV today but few hours later went back to afib with CVR    Then went back to afib while in hospital  We started amiodarone for 1 week and then we did Cardioversion and get to NSR    Today came for F/u  He is back to afib with CVR       Pt here for 1 yr check up     EKG done today     Denies chest pain, dizziness, sob, edema  and, palpitations     Had  Ablation with Dr. Ольга Vizcaino on 7-1-16. Patient Seen, Chart, Consults notes, Labs, Radiology studies reviewed. Patient Active Problem List   Diagnosis    DJD (degenerative joint disease) of knee    Atrial fibrillation with rapid ventricular response (HCC)    Persistent atrial fibrillation ([address] s/p two CV and following successful CV pat aways went back to afib    Cardiomyopathy ([address] EF 40% and repeat echo 2/2017- EF 55%    Essential hypertension    S/P RF ablation operation for arrhythmia - atrial Fib July 1, 2016 successful    S/P ablation of atrial fibrillation    MARGE on CPAP    Obesity (BMI 30-39. 9)    Tobacco abuse       Past Surgical History:   Procedure Laterality Date    ATRIAL ABLATION SURGERY  07/01/2016    ablation with Dr. Arabella Cloud on 7-1-16.     JOINT REPLACEMENT Right 2014    KNEE    KNEE ARTHROSCOPY Right 1980s    WRIST SURGERY Left 1980s       No Known Allergies     Family History   Problem Relation Age of Onset   Ava Coral Cancer Mother         lung    Cancer Father         unknown    Heart Disease Brother         Social History     Socioeconomic History    Marital status:      Spouse name: Not on file    Number of children: Not on file    Years of education: Not on file    Highest education level: Not on file   Occupational History    Not on file   Tobacco Use    Smoking status: Current Every Day Smoker     Packs/day: 1.00     Years: 47.00     Pack years: 47.00     Types: Cigarettes     Start date: 9/9/1972    Smokeless tobacco: Former User   Substance and Sexual Activity    Alcohol use: Yes     Comment: OCC    Drug use: No    Sexual activity: Not on file   Other Topics Concern    Not on file   Social History Narrative    Not on file     Social Determinants of Health     Financial Resource Strain:     Difficulty of Paying Living Expenses:    Food Insecurity:     Worried About Running Out of Food in the Last Year:     [address] in the Last Year:    Transportation Needs:     Lack of Transportation (Medical):  Lack of Transportation (Non-Medical):    Physical Activity:     Days of Exercise per Week:     Minutes of Exercise per Session:    Stress:     Feeling of Stress :    Social Connections:     Frequency of Communication with Friends and Family:     Frequency of Social Gatherings with Friends and Family:     Attends Denominational Services:     Active Member of Clubs or Organizations:     Attends Club or Organization Meetings:     Marital Status:    Intimate Partner Violence:     Fear of Current or Ex-Partner:     Emotionally Abused:     Physically Abused:     Sexually Abused:        Current Outpatient Medications   Medication Sig Dispense Refill    metoprolol succinate (TOPROL XL) 50 MG extended release tablet Take 1.5 tablets by mouth 2 times daily 270 tablet 3    lisinopril (PRINIVIL;ZESTRIL) 5 MG tablet take 1 tablet by mouth once daily 90 tablet 3    aspirin 325 MG EC tablet Take 1 tablet by mouth daily 90 tablet 3     No current facility-administered medications for this visit. Review of Systems -     General ROS: negative  Psychological ROS: negative  Hematological and Lymphatic ROS: No history of blood clots or bleeding disorder.    Respiratory ROS: no cough, shortness of breath, or wheezing  Cardiovascular ROS: no chest pain or dyspnea on exertion  Gastrointestinal ROS: negative  Genito-Urinary ROS: no dysuria, trouble voiding, or hematuria  Musculoskeletal ROS: negative  Neurological ROS: no TIA or stroke symptoms  Dermatological ROS: negative      Blood pressure (!) 124/90, pulse 56, height 6' 3\" (1.905 m), weight (!) 303 lb 2 oz (137.5 kg). Physical Examination:    General appearance - alert, well appearing, and in no distress  Mental status - alert, oriented to person, place, and time  Neck - supple, no significant adenopathy, no JVD, or carotid bruits  Chest - clear to auscultation, no wheezes, rales or rhonchi, symmetric air entry  Heart - normal rate, regular rhythm, normal S1, S2, no murmurs, rubs, clicks or gallops  Abdomen - soft, nontender, nondistended, no masses or organomegaly  Neurological - alert, oriented, normal speech, no focal findings or movement disorder noted  Musculoskeletal - no joint tenderness, deformity or swelling  Extremities - peripheral pulses normal, no pedal edema, no clubbing or cyanosis  Skin - normal coloration and turgor, no rashes, no suspicious skin lesions noted    Lab  No results for input(s): CKTOTAL, CKMB, CKMBINDEX, TROPONINI in the last 72 hours.   CBC:   Lab Results   Component Value Date    WBC 9.9 02/26/2016    RBC 5.59 02/26/2016    HGB 17.1 02/26/2016    HCT 51.0 02/26/2016    MCV 91.3 02/26/2016    MCH 30.5 02/26/2016    MCHC 33.4 02/26/2016    RDW 12.9 02/26/2016     02/26/2016    MPV 10.1 02/26/2016     BMP:    Lab Results   Component Value Date     09/26/2018    K 4.7 09/26/2018     09/26/2018    CO2 22 09/26/2018    BUN 16 09/26/2018    CREATININE 0.8 09/26/2018    CALCIUM 9.1 09/26/2018    LABGLOM >90 09/26/2018    GLUCOSE 108 09/26/2018     Hepatic Function Panel:  No results found for: ALKPHOS, ALT, AST, PROT, BILITOT, BILIDIR, IBILI, LABALBU  Magnesium:    Lab Results   Component Value Date    MG 2.0 02/18/2016     Warfarin PT/INR:  No components found for: PTPATWAR, PTINRWAR  HgBA1c:  No results found for: LABA1C  FLP:  No results found for: TRIG, HDL, LDLCALC, LDLDIRECT, LABVLDL  TSH:    Lab Results   Component Value Date    TSH 1.680 02/16/2016     ekg 8/2/16  Sinus  Rhythm   -RSR(V1) -nondiagnostic.    -Old anterior infarct. ABNORMAL     EKG NSR no acute changes    Event monitor from 08/15/2016 - 09/13/2016   The patient's monitoring period was 08/15/2016 - 09/13/2016. Baseline sample showed Sinus Bradycardia w/Artifact with a heart rate of 59.6 bpm. There  were 0 critical, 0 serious, and 5 stable events that occurred. The report analysis of the critical, serious, stable and manually triggered events are listed  below. Manually Detected Events:  1 Stable: Sinus Bradycardia w/Artifact   No Symptom or Accidental Push  1 Stable: Sinus Rhythm w/Artifact/Lead Loss   Asymptomatic/Work/Standing No Symptom or Accidental  Push  Automatically Detected Events:  1 Stable: Bradyarrhythmia  1 Stable: Sinus Arrhythmia w/Artifact  1 Stable: Sinus Bradycardia w/Bigeminal PACs    EKG 9/28/18  Sinus  Rhythm   WITHIN NORMAL LIMITS    EKG 10/4/19  NSR, no abn, QTC WNL    ekg 10/09/2020  Sinus  Rhythm   WITHIN NORMAL LIMITS   msec    ekg 10/13/21  Sinus  Bradycardia   -  Nonspecific T-abnormality. ABNORMAL       Assessment     Diagnosis Orders   1. Essential hypertension     2. Dilated cardiomyopathy ([address]  EKG 12 lead   3. S/P ablation of atrial fibrillation     4. S/P RF ablation operation for arrhythmia - atrial Fib July 1, 2016 successful     5.  Tobacco abuse           Previous Admission DX    S/p MARINA-CV- successful but few hours later went back to afib with CVR  New onset AFB with RVR of less than 24 hours time at present  AFB noted yesterday with surgery    CMP EF 40% - probably Tachy mediated and neg Nuc stress    CHADS2= 1 new HTN    HTN- new diagnosis    S\p surgery for smash injury to middle finge    DJD- chronic neck pain      Plan   The  current meds and labs reviewed    Continue the current treatment and with constant vigilance to changes in symptoms and also any potential

## 2022-03-22 ENCOUNTER — OFFICE VISIT (OUTPATIENT)
Dept: PULMONOLOGY | Age: 67
End: 2022-03-22
Payer: COMMERCIAL

## 2022-03-22 VITALS
TEMPERATURE: 97.8 F | HEIGHT: 75 IN | WEIGHT: 302.4 LBS | HEART RATE: 78 BPM | SYSTOLIC BLOOD PRESSURE: 130 MMHG | OXYGEN SATURATION: 98 % | BODY MASS INDEX: 37.6 KG/M2 | DIASTOLIC BLOOD PRESSURE: 78 MMHG

## 2022-03-22 DIAGNOSIS — G47.33 OSA ON CPAP: Primary | ICD-10-CM

## 2022-03-22 DIAGNOSIS — E66.9 OBESITY (BMI 30-39.9): ICD-10-CM

## 2022-03-22 DIAGNOSIS — Z99.89 OSA ON CPAP: Primary | ICD-10-CM

## 2022-03-22 PROCEDURE — 99213 OFFICE O/P EST LOW 20 MIN: CPT | Performed by: PHYSICIAN ASSISTANT

## 2022-03-22 ASSESSMENT — ENCOUNTER SYMPTOMS
DIARRHEA: 0
WHEEZING: 0
CHEST TIGHTNESS: 0
ALLERGIC/IMMUNOLOGIC NEGATIVE: 1
NAUSEA: 0
COUGH: 0
BACK PAIN: 0
STRIDOR: 0
SHORTNESS OF BREATH: 0
EYES NEGATIVE: 1

## 2022-03-22 NOTE — PATIENT INSTRUCTIONS
Patient Education        Stopping Smoking: Care Instructions  Your Care Instructions     Cigarette smokers crave the nicotine in cigarettes. Giving it up is much harder than simply changing a habit. Your body has to stop craving the nicotine. It is hard to quit, but you can do it. There are many tools that people use to quit smoking. You may find that combining tools works best for you. There are several steps to quitting. First you get ready to quit. Then you get support to help you. After that, you learn new skills and behaviors to become a nonsmoker. For many people, a necessary step is getting and using medicine. Your doctor will help you set up the plan that best meets your needs. You may want to attend a smoking cessation program to help you quit smoking. When you choose a program, look for one that has proven success. Ask your doctor for ideas. You will greatly increase your chances of success if you take medicine as well as get counseling or join a cessation program.  Some of the changes you feel when you first quit tobacco are uncomfortable. Your body will miss the nicotine at first, and you may feel short-tempered and grumpy. You may have trouble sleeping or concentrating. Medicine can help you deal with these symptoms. You may struggle with changing your smoking habits and rituals. The last step is the tricky one: Be prepared for the smoking urge to continue for a time. This is a lot to deal with, but keep at it. You will feel better. Follow-up care is a key part of your treatment and safety. Be sure to make and go to all appointments, and call your doctor if you are having problems. It's also a good idea to know your test results and keep a list of the medicines you take. How can you care for yourself at home? · Ask your family, friends, and coworkers for support. You have a better chance of quitting if you have help and support.   · Join a support group, such as Nicotine Anonymous, for people who are trying to quit smoking. · Consider signing up for a smoking cessation program, such as the American Lung Association's Freedom from Smoking program.  · Get text messaging support. Go to the website at www.smokefree. gov to sign up for the [facility] program.  · Set a quit date. Pick your date carefully so that it is not right in the middle of a big deadline or stressful time. Once you quit, do not even take a puff. Get rid of all ashtrays and lighters after your last cigarette. Clean your house and your clothes so that they do not smell of smoke. · Learn how to be a nonsmoker. Think about ways you can avoid those things that make you reach for a cigarette. ? Avoid situations that put you at greatest risk for smoking. For some people, it is hard to have a drink with friends without smoking. For others, they might skip a coffee break with coworkers who smoke. ? Change your daily routine. Take a different route to work or eat a meal in a different place. · Cut down on stress. Calm yourself or release tension by doing an activity you enjoy, such as reading a book, taking a hot bath, or gardening. · Talk to your doctor or pharmacist about nicotine replacement therapy, which replaces the nicotine in your body. You still get nicotine but you do not use tobacco. Nicotine replacement products help you slowly reduce the amount of nicotine you need. These products come in several forms, many of them available over-the-counter:  ? Nicotine patches  ? Nicotine gum and lozenges  ? Nicotine inhaler  · Ask your doctor about bupropion (Wellbutrin) or varenicline (Chantix), which are prescription medicines. They do not contain nicotine. They help you by reducing withdrawal symptoms, such as stress and anxiety. · Some people find hypnosis, acupuncture, and massage helpful for ending the smoking habit. · Eat a healthy diet and get regular exercise.  Having healthy habits will help your body move past its craving for nicotine. · Be prepared to keep trying. Most people are not successful the first few times they try to quit. Do not get mad at yourself if you smoke again. Make a list of things you learned and think about when you want to try again, such as next week, next month, or next year. Where can you learn more? Go to https://[employer]rapeuticspepiceweb.[employer]. org and sign in to your [employer] e-solution account. Enter Z692 in the Appetise box to learn more about \"Stopping Smoking: Care Instructions. \"     If you do not have an account, please click on the \"Sign Up Now\" link. Current as of: February 11, 2021               Content Version: 13.1  © 2006-2021 [employer]wise, [employer]. Care instructions adapted under license by [employer]. If you have questions about a medical condition or this instruction, always ask your healthcare professional. [address] any warranty or liability for your use of this information.

## 2022-03-22 NOTE — PROGRESS NOTES
[facility]      [facility]         [id number]  3/22/2022   Chief Complaint   Patient presents with    Follow-up     f2f for insurance with download         Pt of Dr. Viry Jauregui    PAP Download:   Original or initial AHI: 19.2     Date of initial study: 12/5/2003      Compliant  93.3%     Noncompliant n/a %     PAP Type Auto Cpap Level  10   Avg Hrs/Day 10 hr 12 min  AHI: 3.0   Recorded compliance dates , 2/16/2022  to 3/17/2022   Machine/Mfg:   [] ResMed    [x] Respironics/Dreamstation   Interface:   [x] Nasal    [] Nasal pillows   [] FFM      Provider:      [] -HME     []Campos     [x] Susan    [] Fei Moncada    [] Schw[place]ans               [] [facility]      [] Adaptive    [] [address]      [] Other    Neck Size: 19.5  Mallampati Mallampati 3  ESS:  0  SAQLI: 98    Here is a scan of the most recent download:          Presentation:   Melvi Sanchez presents for sleep medicine follow up for obstructive sleep apnea  Since the last visit, Melvi Sanchez is doing well with  PAP. He is here for face to face for Medicare. He is sleeping well and feels rested. Equipment issues: The pressure is  acceptable, the mask is acceptable     Sleep issues:  Do you feel better? Yes  More rested? Yes   Better concentration? yes    Progress History:   Since last visit any new medical issues? No  New ER or hospital visits? No  Any new or changes in medicines? No  Any new sleep medicines? No    Review of Systems -   Review of Systems   Constitutional: Negative for activity change, appetite change, chills and fever. HENT: Negative for congestion and postnasal drip. Eyes: Negative. Respiratory: Negative for cough, chest tightness, shortness of breath, wheezing and stridor. Cardiovascular: Negative for chest pain and leg swelling. Gastrointestinal: Negative for diarrhea and nausea. Endocrine: Negative. Genitourinary: Negative. Musculoskeletal: Negative.   Negative for arthralgias and back patient  - He  was advised to continue current positive airway pressure therapy with above described pressure. - He  advised to keep good compliance with current recommended pressure to get optimal results and clinical improvement  - Recommend 7-9 hours of sleep with PAP  - He was advised to call [employer] regarding supplies if needed.   -He call my office for earlier appointment if needed for worsening of sleep symptoms.   - He was instructed on weight loss  - Claudell Blamer was educated about my impression and plan. Patient verbalizesunderstanding.   We will see Nicole Granados back in: 1 year with download    Information added by my medical assistant/LPN was reviewed today         Bryce Jasso PA-C, MPAS  3/22/2022

## 2022-04-06 ENCOUNTER — OFFICE VISIT (OUTPATIENT)
Dept: CARDIOLOGY CLINIC | Age: 67
End: 2022-04-06
Payer: COMMERCIAL

## 2022-04-06 VITALS
HEART RATE: 61 BPM | WEIGHT: 307 LBS | BODY MASS INDEX: 37.38 KG/M2 | SYSTOLIC BLOOD PRESSURE: 130 MMHG | HEIGHT: 76 IN | DIASTOLIC BLOOD PRESSURE: 86 MMHG

## 2022-04-06 DIAGNOSIS — Z72.0 TOBACCO ABUSE: ICD-10-CM

## 2022-04-06 DIAGNOSIS — Z86.79 S/P RF ABLATION OPERATION FOR ARRHYTHMIA: ICD-10-CM

## 2022-04-06 DIAGNOSIS — Z98.890 S/P RF ABLATION OPERATION FOR ARRHYTHMIA: ICD-10-CM

## 2022-04-06 DIAGNOSIS — Z98.890 S/P ABLATION OF ATRIAL FIBRILLATION: ICD-10-CM

## 2022-04-06 DIAGNOSIS — I10 ESSENTIAL HYPERTENSION: ICD-10-CM

## 2022-04-06 DIAGNOSIS — I42.0 DILATED CARDIOMYOPATHY (HCC): Primary | ICD-10-CM

## 2022-04-06 DIAGNOSIS — Z86.79 S/P ABLATION OF ATRIAL FIBRILLATION: ICD-10-CM

## 2022-04-06 PROCEDURE — 4004F PT TOBACCO SCREEN RCVD TLK: CPT | Performed by: INTERNAL MEDICINE

## 2022-04-06 PROCEDURE — G8427 DOCREV CUR MEDS BY ELIG CLIN: HCPCS | Performed by: INTERNAL MEDICINE

## 2022-04-06 PROCEDURE — 4040F PNEUMOC VAC/ADMIN/RCVD: CPT | Performed by: INTERNAL MEDICINE

## 2022-04-06 PROCEDURE — 1123F ACP DISCUSS/DSCN MKR DOCD: CPT | Performed by: INTERNAL MEDICINE

## 2022-04-06 PROCEDURE — 3017F COLORECTAL CA SCREEN DOC REV: CPT | Performed by: INTERNAL MEDICINE

## 2022-04-06 PROCEDURE — G8417 CALC BMI ABV UP PARAM F/U: HCPCS | Performed by: INTERNAL MEDICINE

## 2022-04-06 PROCEDURE — 99214 OFFICE O/P EST MOD 30 MIN: CPT | Performed by: INTERNAL MEDICINE

## 2022-04-06 RX ORDER — METOPROLOL SUCCINATE 50 MG/1
75 TABLET, EXTENDED RELEASE ORAL 2 TIMES DAILY
Qty: 270 TABLET | Refills: 3 | Status: SHIPPED | OUTPATIENT
Start: 2022-04-06

## 2022-04-06 RX ORDER — LISINOPRIL 5 MG/1
TABLET ORAL
Qty: 90 TABLET | Refills: 3 | Status: SHIPPED | OUTPATIENT
Start: 2022-04-06
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
## 2022-12-01 RX ORDER — LISINOPRIL 5 MG/1
TABLET ORAL
Qty: 90 TABLET | Refills: 1 | Status: SHIPPED | OUTPATIENT
Start: 2022-12-01

## 2022-12-01 RX ORDER — METOPROLOL SUCCINATE 50 MG/1
75 TABLET, EXTENDED RELEASE ORAL 2 TIMES DAILY
Qty: 270 TABLET | Refills: 1 | Status: SHIPPED | OUTPATIENT
Start: 2022-12-01

## 2022-12-01 NOTE — TELEPHONE ENCOUNTER
Severiano Samuels called requesting a refill on the following medications:  Requested Prescriptions     Pending Prescriptions Disp Refills    lisinopril (PRINIVIL;ZESTRIL) 5 MG tablet 90 tablet 3     Sig: take 1 tablet by mouth once daily    metoprolol succinate (TOPROL XL) 50 MG extended release tablet 270 tablet 3     Sig: Take 1.5 tablets by mouth 2 times daily     Pharmacy verified:  .pv  [id number] - [place], [place] - [address] [facility] [phone]    Date of last visit: 04/06/2022  Date of next visit (if applicable): 7/3/3942    *patients spouse said he is seen annually, but scripts were only sent for 6 months.

## 2023-03-20 NOTE — PROGRESS NOTES
dizziness and light-headedness. Psychiatric/Behavioral: Negative. All other systems reviewed and are negative. Physical Exam:    BMI:  Body mass index is 40.26 kg/m². Wt Readings from Last 3 Encounters:   03/21/23 (!) 317 lb 12.8 oz (144.2 kg)   04/06/22 (!) 307 lb (139.3 kg)   03/22/22 (!) 302 lb 6.4 oz (137.2 kg)     Weight gained 15 lbs over 1 year  Vitals: /82 (Site: Right Lower Arm, Position: Sitting, Cuff Size: Medium Adult)   Pulse 66   Temp 97.5 °F (36.4 °C) (Temporal)   Ht 6' 2.5\" (1.892 m)   Wt (!) 317 lb 12.8 oz (144.2 kg)   SpO2 94%   BMI 40.26 kg/m²       Physical Exam  Constitutional:       Appearance: Normal appearance. He is normal weight. HENT:      Head: Normocephalic and atraumatic. Right Ear: External ear normal.      Left Ear: External ear normal.      Nose: Nose normal.   Eyes:      Extraocular Movements: Extraocular movements intact. Conjunctiva/sclera: Conjunctivae normal.      Pupils: Pupils are equal, round, and reactive to light. Pulmonary:      Effort: Pulmonary effort is normal.   Musculoskeletal:      Cervical back: Normal range of motion and neck supple. Neurological:      General: No focal deficit present. Mental Status: He is alert and oriented to person, place, and time. Psychiatric:         Attention and Perception: Attention and perception normal.         Mood and Affect: Mood and affect normal.         Speech: Speech normal.         Behavior: Behavior normal. Behavior is cooperative. Thought Content: Thought content normal.         Cognition and Memory: Cognition normal.         Judgment: Judgment normal.         ASSESSMENT/DIAGNOSIS     Diagnosis Orders   1. MARGE on CPAP        2. Morbid obesity with BMI of 40.0-44.9, adult [facility])                 Plan   Do you need any equipment today?  Yes update supplies  - Download reviewed and discussed with patient  - He  was advised to continue current positive airway pressure therapy

## 2023-03-21 ENCOUNTER — OFFICE VISIT (OUTPATIENT)
Dept: PULMONOLOGY | Age: 68
End: 2023-03-21
Payer: MEDICARE

## 2023-03-21 VITALS
HEIGHT: 75 IN | SYSTOLIC BLOOD PRESSURE: 130 MMHG | TEMPERATURE: 97.5 F | DIASTOLIC BLOOD PRESSURE: 82 MMHG | OXYGEN SATURATION: 94 % | WEIGHT: 315 LBS | BODY MASS INDEX: 39.17 KG/M2 | HEART RATE: 66 BPM

## 2023-03-21 DIAGNOSIS — G47.33 OSA ON CPAP: Primary | ICD-10-CM

## 2023-03-21 DIAGNOSIS — Z99.89 OSA ON CPAP: Primary | ICD-10-CM

## 2023-03-21 DIAGNOSIS — E66.01 MORBID OBESITY WITH BMI OF 40.0-44.9, ADULT (HCC): ICD-10-CM

## 2023-03-21 PROCEDURE — 3075F SYST BP GE 130 - 139MM HG: CPT | Performed by: PHYSICIAN ASSISTANT

## 2023-03-21 PROCEDURE — 4004F PT TOBACCO SCREEN RCVD TLK: CPT | Performed by: PHYSICIAN ASSISTANT

## 2023-03-21 PROCEDURE — G8427 DOCREV CUR MEDS BY ELIG CLIN: HCPCS | Performed by: PHYSICIAN ASSISTANT

## 2023-03-21 PROCEDURE — 1123F ACP DISCUSS/DSCN MKR DOCD: CPT | Performed by: PHYSICIAN ASSISTANT

## 2023-03-21 PROCEDURE — G8484 FLU IMMUNIZE NO ADMIN: HCPCS | Performed by: PHYSICIAN ASSISTANT

## 2023-03-21 PROCEDURE — G8417 CALC BMI ABV UP PARAM F/U: HCPCS | Performed by: PHYSICIAN ASSISTANT

## 2023-03-21 PROCEDURE — 3017F COLORECTAL CA SCREEN DOC REV: CPT | Performed by: PHYSICIAN ASSISTANT

## 2023-03-21 PROCEDURE — 99213 OFFICE O/P EST LOW 20 MIN: CPT | Performed by: PHYSICIAN ASSISTANT

## 2023-03-21 PROCEDURE — 3079F DIAST BP 80-89 MM HG: CPT | Performed by: PHYSICIAN ASSISTANT

## 2023-03-21 ASSESSMENT — ENCOUNTER SYMPTOMS
ALLERGIC/IMMUNOLOGIC NEGATIVE: 1
NAUSEA: 0
BACK PAIN: 0
EYES NEGATIVE: 1
STRIDOR: 0
SHORTNESS OF BREATH: 0
WHEEZING: 0
CHEST TIGHTNESS: 0
COUGH: 0
DIARRHEA: 0

## 2023-04-05 ENCOUNTER — OFFICE VISIT (OUTPATIENT)
Dept: CARDIOLOGY CLINIC | Age: 68
End: 2023-04-05
Payer: MEDICARE

## 2023-04-05 ENCOUNTER — HOSPITAL ENCOUNTER (OUTPATIENT)
Age: 68
Discharge: HOME | End: 2023-04-05
Payer: MEDICARE

## 2023-04-05 VITALS
WEIGHT: 314 LBS | DIASTOLIC BLOOD PRESSURE: 68 MMHG | HEIGHT: 75 IN | BODY MASS INDEX: 39.04 KG/M2 | SYSTOLIC BLOOD PRESSURE: 122 MMHG | HEART RATE: 63 BPM

## 2023-04-05 DIAGNOSIS — I42.0 DILATED CARDIOMYOPATHY (HCC): ICD-10-CM

## 2023-04-05 DIAGNOSIS — Z98.890 S/P ABLATION OF ATRIAL FIBRILLATION: ICD-10-CM

## 2023-04-05 DIAGNOSIS — Z72.0 TOBACCO ABUSE: ICD-10-CM

## 2023-04-05 DIAGNOSIS — I42.0 DILATED CARDIOMYOPATHY (HCC): Primary | ICD-10-CM

## 2023-04-05 DIAGNOSIS — R06.09 DOE (DYSPNEA ON EXERTION): ICD-10-CM

## 2023-04-05 DIAGNOSIS — Z86.79 S/P ABLATION OF ATRIAL FIBRILLATION: ICD-10-CM

## 2023-04-05 DIAGNOSIS — I10 ESSENTIAL HYPERTENSION: ICD-10-CM

## 2023-04-05 DIAGNOSIS — Z86.79 S/P RF ABLATION OPERATION FOR ARRHYTHMIA: ICD-10-CM

## 2023-04-05 DIAGNOSIS — Z98.890 S/P RF ABLATION OPERATION FOR ARRHYTHMIA: ICD-10-CM

## 2023-04-05 LAB
ALBUMIN SERPL BCG-MCNC: 4.2 G/DL (ref 3.5–5.1)
ALP SERPL-CCNC: 102 U/L (ref 38–126)
ALT SERPL W/O P-5'-P-CCNC: 27 U/L (ref 11–66)
ANION GAP SERPL CALC-SCNC: 9 MEQ/L (ref 8–16)
AST SERPL-CCNC: 17 U/L (ref 5–40)
BILIRUB CONJ SERPL-MCNC: < 0.2 MG/DL (ref 0–0.3)
BILIRUB SERPL-MCNC: 0.8 MG/DL (ref 0.3–1.2)
BUN SERPL-MCNC: 12 MG/DL (ref 7–22)
CALCIUM SERPL-MCNC: 9.1 MG/DL (ref 8.5–10.5)
CHLORIDE SERPL-SCNC: 101 MEQ/L (ref 98–111)
CO2 SERPL-SCNC: 26 MEQ/L (ref 23–33)
CREAT SERPL-MCNC: 1 MG/DL (ref 0.4–1.2)
DEPRECATED RDW RBC AUTO: 46.3 FL (ref 35–45)
ERYTHROCYTE [DISTWIDTH] IN BLOOD BY AUTOMATED COUNT: 13.6 % (ref 11.5–14.5)
GFR SERPL CREATININE-BSD FRML MDRD: > 60 ML/MIN/1.73M2
GLUCOSE SERPL-MCNC: 98 MG/DL (ref 70–108)
HCT VFR BLD AUTO: 51.5 % (ref 42–52)
HGB BLD-MCNC: 17 GM/DL (ref 14–18)
MAGNESIUM SERPL-MCNC: 2 MG/DL (ref 1.6–2.4)
MCH RBC QN AUTO: 30.5 PG (ref 26–33)
MCHC RBC AUTO-ENTMCNC: 33 GM/DL (ref 32.2–35.5)
MCV RBC AUTO: 92.3 FL (ref 80–94)
PLATELET # BLD AUTO: 235 THOU/MM3 (ref 130–400)
PMV BLD AUTO: 11.3 FL (ref 9.4–12.4)
POTASSIUM SERPL-SCNC: 4.7 MEQ/L (ref 3.5–5.2)
PROT SERPL-MCNC: 6.9 G/DL (ref 6.1–8)
RBC # BLD AUTO: 5.58 MILL/MM3 (ref 4.7–6.1)
SODIUM SERPL-SCNC: 136 MEQ/L (ref 135–145)
TSH SERPL DL<=0.005 MIU/L-ACNC: 1.07 UIU/ML (ref 0.4–4.2)
WBC # BLD AUTO: 10.8 THOU/MM3 (ref 4.8–10.8)

## 2023-04-05 PROCEDURE — G8417 CALC BMI ABV UP PARAM F/U: HCPCS | Performed by: INTERNAL MEDICINE

## 2023-04-05 PROCEDURE — 4004F PT TOBACCO SCREEN RCVD TLK: CPT | Performed by: INTERNAL MEDICINE

## 2023-04-05 PROCEDURE — 85027 COMPLETE CBC AUTOMATED: CPT

## 2023-04-05 PROCEDURE — 1123F ACP DISCUSS/DSCN MKR DOCD: CPT | Performed by: INTERNAL MEDICINE

## 2023-04-05 PROCEDURE — 82248 BILIRUBIN DIRECT: CPT

## 2023-04-05 PROCEDURE — 36415 COLL VENOUS BLD VENIPUNCTURE: CPT

## 2023-04-05 PROCEDURE — G8427 DOCREV CUR MEDS BY ELIG CLIN: HCPCS | Performed by: INTERNAL MEDICINE

## 2023-04-05 PROCEDURE — 3078F DIAST BP <80 MM HG: CPT | Performed by: INTERNAL MEDICINE

## 2023-04-05 PROCEDURE — 84443 ASSAY THYROID STIM HORMONE: CPT

## 2023-04-05 PROCEDURE — 3074F SYST BP LT 130 MM HG: CPT | Performed by: INTERNAL MEDICINE

## 2023-04-05 PROCEDURE — 80053 COMPREHEN METABOLIC PANEL: CPT

## 2023-04-05 PROCEDURE — 83735 ASSAY OF MAGNESIUM: CPT

## 2023-04-05 PROCEDURE — 99214 OFFICE O/P EST MOD 30 MIN: CPT | Performed by: INTERNAL MEDICINE

## 2023-04-05 PROCEDURE — 3017F COLORECTAL CA SCREEN DOC REV: CPT | Performed by: INTERNAL MEDICINE

## 2023-04-05 PROCEDURE — 93000 ELECTROCARDIOGRAM COMPLETE: CPT | Performed by: INTERNAL MEDICINE

## 2023-04-05 RX ORDER — METOPROLOL SUCCINATE 50 MG/1
75 TABLET, EXTENDED RELEASE ORAL 2 TIMES DAILY
Qty: 270 TABLET | Refills: 3 | Status: SHIPPED | OUTPATIENT
Start: 2023-04-05

## 2023-04-05 RX ORDER — LISINOPRIL 5 MG/1
TABLET ORAL
Qty: 90 TABLET | Refills: 3 | Status: SHIPPED | OUTPATIENT
Start: 2023-04-05

## 2023-04-05 NOTE — PROGRESS NOTES
Chief Complaint   Patient presents with    1 Year Follow Up    Cardiomyopathy    Hypertension       Originally Came in for afib with RVR  Had successful TEECV today but few hours later went back to afib with CVR    Then went back to afib while in hospital  We started amiodarone for 1 week and then we did Cardioversion and get to NSR        1 year follow up. EKG done today. No sob    Denies chest pain, dizziness, sob, edema  and, palpitations     Had  Ablation with Dr. Beckwith Reading on 7-1-16. Patient Seen, Chart, Consults notes, Labs, Radiology studies reviewed. Patient Active Problem List   Diagnosis    DJD (degenerative joint disease) of knee    Atrial fibrillation with rapid ventricular response (HCC)    Persistent atrial fibrillation (HCC) s/p two CV and following successful CV pat aways went back to afib    Cardiomyopathy ([address] EF 40% and repeat echo 2/2017- EF 55%    Essential hypertension    S/P RF ablation operation for arrhythmia - atrial Fib July 1, 2016 successful    S/P ablation of atrial fibrillation    MARGE on CPAP    Obesity (BMI 30-39. 9)    Tobacco abuse    BALBUENA (dyspnea on exertion)       Past Surgical History:   Procedure Laterality Date    ATRIAL ABLATION SURGERY  07/01/2016    ablation with Dr. Charisse Celestin on 7-1-16.     JOINT REPLACEMENT Right 2014    KNEE    KNEE ARTHROSCOPY Right 1980s    WRIST SURGERY Left 1980s       No Known Allergies     Family History   Problem Relation Age of Onset    Cancer Mother         lung    Cancer Father         unknown    Heart Disease Brother         Social History     Socioeconomic History    Marital status:      Spouse name: Not on file    Number of children: Not on file    Years of education: Not on file    Highest education level: Not on file   Occupational History    Not on file   Tobacco Use    Smoking status: Every Day     Packs/day: 1.00     Years: 47.00     Pack years: 47.00     Types: Cigarettes     Start date: 9/9/1972    Smokeless

## 2023-04-06 ENCOUNTER — HOSPITAL ENCOUNTER (OUTPATIENT)
Age: 68
Discharge: HOME | End: 2023-04-06
Payer: MEDICARE

## 2023-04-06 LAB
CHOLEST SERPL-MCNC: 180 MG/DL (ref 100–199)
HDLC SERPL-MCNC: 33 MG/DL
LDLC SERPL CALC-MCNC: 114 MG/DL
TRIGL SERPL-MCNC: 164 MG/DL (ref 0–199)

## 2023-04-06 PROCEDURE — 80061 LIPID PANEL: CPT

## 2023-04-06 PROCEDURE — 36415 COLL VENOUS BLD VENIPUNCTURE: CPT

## 2023-04-18 ENCOUNTER — HOSPITAL ENCOUNTER (OUTPATIENT)
Dept: NON INVASIVE DIAGNOSTICS | Age: 68
Discharge: HOME | End: 2023-04-18
Payer: MEDICARE

## 2023-04-18 DIAGNOSIS — Z72.0 TOBACCO ABUSE: ICD-10-CM

## 2023-04-18 DIAGNOSIS — I10 ESSENTIAL HYPERTENSION: ICD-10-CM

## 2023-04-18 DIAGNOSIS — Z98.890 S/P ABLATION OF ATRIAL FIBRILLATION: ICD-10-CM

## 2023-04-18 DIAGNOSIS — Z86.79 S/P RF ABLATION OPERATION FOR ARRHYTHMIA: ICD-10-CM

## 2023-04-18 DIAGNOSIS — Z86.79 S/P ABLATION OF ATRIAL FIBRILLATION: ICD-10-CM

## 2023-04-18 DIAGNOSIS — Z98.890 S/P RF ABLATION OPERATION FOR ARRHYTHMIA: ICD-10-CM

## 2023-04-18 DIAGNOSIS — I42.0 DILATED CARDIOMYOPATHY (HCC): ICD-10-CM

## 2023-04-18 LAB
LV EF: 60 %
LVEF MODALITY: NORMAL

## 2023-04-18 PROCEDURE — 93226 XTRNL ECG REC<48 HR SCAN A/R: CPT

## 2023-04-18 PROCEDURE — 93225 XTRNL ECG REC<48 HRS REC: CPT

## 2023-04-18 PROCEDURE — 93306 TTE W/DOPPLER COMPLETE: CPT

## 2023-04-18 NOTE — PROCEDURES
The skin was prepped and a  48 hour holter monitor was applied. The patient was instructed on the documentation of symptoms and the purpose of the holter as well as the things to avoid while wearing the holter. The patient was instructed to remove and return the holter on 04/20/2023.   The serial number of the holter that was applied is [id number]

## 2023-04-27 LAB
ACQUISITION DURATION: NORMAL S
AVERAGE HEART RATE: 65 BPM
HOOKUP DATE: NORMAL
HOOKUP TIME: NORMAL
MAX HEART RATE TIME/DATE: NORMAL
MAX HEART RATE: 92 BPM
MIN HEART RATE TIME/DATE: NORMAL
MIN HEART RATE: 48 BPM
NUMBER OF QRS COMPLEXES: NORMAL
NUMBER OF SUPRAVENTRICULAR COUPLETS: 0
NUMBER OF SUPRAVENTRICULAR ECTOPICS: 675
NUMBER OF SUPRAVENTRICULAR ISOLATED BEATS: 657
NUMBER OF VENTRICULAR BIGEMINAL CYCLES: 0
NUMBER OF VENTRICULAR COUPLETS: 0
NUMBER OF VENTRICULAR ECTOPICS: 89

## 2024-03-14 NOTE — PROGRESS NOTES
arthralgias. Negative for back pain.   Skin: Negative.    Allergic/Immunologic: Negative.    Neurological: Negative.  Negative for dizziness and light-headedness.   Psychiatric/Behavioral: Negative.     All other systems reviewed and are negative.       Physical Exam:    BMI:  Body mass index is 40.94 kg/m².    Wt Readings from Last 3 Encounters:   03/19/24 (!) 146.6 kg (323 lb 3.2 oz)   04/05/23 (!) 142.4 kg (314 lb)   03/21/23 (!) 144.2 kg (317 lb 12.8 oz)     Weight gained 9 lbs over 1 year  Vitals: /76 (Site: Left Upper Arm, Position: Sitting, Cuff Size: Large Adult)   Pulse 55   Temp 97.7 °F (36.5 °C) (Oral)   Ht 1.892 m (6' 2.5\")   Wt (!) 146.6 kg (323 lb 3.2 oz)   SpO2 95% Comment: r/a  BMI 40.94 kg/m²       Physical Exam  Constitutional:       Appearance: Normal appearance. He is normal weight.   HENT:      Head: Normocephalic and atraumatic.      Right Ear: External ear normal.      Left Ear: External ear normal.      Nose: Nose normal.   Eyes:      Extraocular Movements: Extraocular movements intact.      Conjunctiva/sclera: Conjunctivae normal.      Pupils: Pupils are equal, round, and reactive to light.   Pulmonary:      Effort: Pulmonary effort is normal.   Musculoskeletal:      Cervical back: Normal range of motion and neck supple.   Neurological:      General: No focal deficit present.      Mental Status: He is alert and oriented to person, place, and time.   Psychiatric:         Attention and Perception: Attention and perception normal.         Mood and Affect: Mood and affect normal.         Speech: Speech normal.         Behavior: Behavior normal. Behavior is cooperative.         Thought Content: Thought content normal.         Cognition and Memory: Cognition normal.         Judgment: Judgment normal.           ASSESSMENT/DIAGNOSIS     Diagnosis Orders   1. MARGE on CPAP        2. Morbid obesity with BMI of 40.0-44.9, adult (HCC)                 Plan   Do you need any equipment today? Yes

## 2024-03-19 ENCOUNTER — OFFICE VISIT (OUTPATIENT)
Dept: PULMONOLOGY | Age: 69
End: 2024-03-19
Payer: MEDICARE

## 2024-03-19 VITALS
SYSTOLIC BLOOD PRESSURE: 134 MMHG | BODY MASS INDEX: 39.17 KG/M2 | DIASTOLIC BLOOD PRESSURE: 76 MMHG | HEART RATE: 55 BPM | OXYGEN SATURATION: 95 % | TEMPERATURE: 97.7 F | WEIGHT: 315 LBS | HEIGHT: 75 IN

## 2024-03-19 DIAGNOSIS — E66.01 MORBID OBESITY WITH BMI OF 40.0-44.9, ADULT (HCC): ICD-10-CM

## 2024-03-19 DIAGNOSIS — G47.33 OSA ON CPAP: Primary | ICD-10-CM

## 2024-03-19 PROCEDURE — 99213 OFFICE O/P EST LOW 20 MIN: CPT | Performed by: PHYSICIAN ASSISTANT

## 2024-03-19 PROCEDURE — 3075F SYST BP GE 130 - 139MM HG: CPT | Performed by: PHYSICIAN ASSISTANT

## 2024-03-19 PROCEDURE — G8417 CALC BMI ABV UP PARAM F/U: HCPCS | Performed by: PHYSICIAN ASSISTANT

## 2024-03-19 PROCEDURE — G8484 FLU IMMUNIZE NO ADMIN: HCPCS | Performed by: PHYSICIAN ASSISTANT

## 2024-03-19 PROCEDURE — G8427 DOCREV CUR MEDS BY ELIG CLIN: HCPCS | Performed by: PHYSICIAN ASSISTANT

## 2024-03-19 PROCEDURE — 1123F ACP DISCUSS/DSCN MKR DOCD: CPT | Performed by: PHYSICIAN ASSISTANT

## 2024-03-19 PROCEDURE — 3078F DIAST BP <80 MM HG: CPT | Performed by: PHYSICIAN ASSISTANT

## 2024-03-19 PROCEDURE — 4004F PT TOBACCO SCREEN RCVD TLK: CPT | Performed by: PHYSICIAN ASSISTANT

## 2024-03-19 PROCEDURE — 3017F COLORECTAL CA SCREEN DOC REV: CPT | Performed by: PHYSICIAN ASSISTANT

## 2024-03-19 ASSESSMENT — ENCOUNTER SYMPTOMS
WHEEZING: 0
DIARRHEA: 0
EYES NEGATIVE: 1
STRIDOR: 0
BACK PAIN: 0
CHEST TIGHTNESS: 0
SHORTNESS OF BREATH: 0
NAUSEA: 0
ALLERGIC/IMMUNOLOGIC NEGATIVE: 1

## 2024-03-29 RX ORDER — LISINOPRIL 5 MG/1
TABLET ORAL
Qty: 90 TABLET | Refills: 3 | Status: SHIPPED | OUTPATIENT
Start: 2024-03-29

## 2024-03-29 RX ORDER — METOPROLOL SUCCINATE 50 MG/1
75 TABLET, EXTENDED RELEASE ORAL 2 TIMES DAILY
Qty: 270 TABLET | Refills: 3 | Status: SHIPPED | OUTPATIENT
Start: 2024-03-29

## 2024-04-15 ENCOUNTER — OFFICE VISIT (OUTPATIENT)
Dept: CARDIOLOGY CLINIC | Age: 69
End: 2024-04-15
Payer: MEDICARE

## 2024-04-15 VITALS
SYSTOLIC BLOOD PRESSURE: 128 MMHG | BODY MASS INDEX: 39.17 KG/M2 | HEART RATE: 63 BPM | WEIGHT: 315 LBS | DIASTOLIC BLOOD PRESSURE: 84 MMHG | HEIGHT: 75 IN

## 2024-04-15 DIAGNOSIS — E66.01 MORBID OBESITY DUE TO EXCESS CALORIES (HCC): ICD-10-CM

## 2024-04-15 DIAGNOSIS — I10 ESSENTIAL HYPERTENSION: Primary | ICD-10-CM

## 2024-04-15 DIAGNOSIS — R06.09 DOE (DYSPNEA ON EXERTION): ICD-10-CM

## 2024-04-15 DIAGNOSIS — Z98.890 S/P ABLATION OF ATRIAL FIBRILLATION: ICD-10-CM

## 2024-04-15 DIAGNOSIS — I42.0 DILATED CARDIOMYOPATHY (HCC): ICD-10-CM

## 2024-04-15 DIAGNOSIS — Z72.0 TOBACCO ABUSE: ICD-10-CM

## 2024-04-15 DIAGNOSIS — Z86.79 S/P ABLATION OF ATRIAL FIBRILLATION: ICD-10-CM

## 2024-04-15 DIAGNOSIS — G47.33 OSA ON CPAP: ICD-10-CM

## 2024-04-15 PROCEDURE — 99214 OFFICE O/P EST MOD 30 MIN: CPT | Performed by: INTERNAL MEDICINE

## 2024-04-15 PROCEDURE — G8417 CALC BMI ABV UP PARAM F/U: HCPCS | Performed by: INTERNAL MEDICINE

## 2024-04-15 PROCEDURE — 3077F SYST BP >= 140 MM HG: CPT | Performed by: INTERNAL MEDICINE

## 2024-04-15 PROCEDURE — G8427 DOCREV CUR MEDS BY ELIG CLIN: HCPCS | Performed by: INTERNAL MEDICINE

## 2024-04-15 PROCEDURE — 3017F COLORECTAL CA SCREEN DOC REV: CPT | Performed by: INTERNAL MEDICINE

## 2024-04-15 PROCEDURE — 93000 ELECTROCARDIOGRAM COMPLETE: CPT | Performed by: INTERNAL MEDICINE

## 2024-04-15 PROCEDURE — 4004F PT TOBACCO SCREEN RCVD TLK: CPT | Performed by: INTERNAL MEDICINE

## 2024-04-15 PROCEDURE — 3080F DIAST BP >= 90 MM HG: CPT | Performed by: INTERNAL MEDICINE

## 2024-04-15 PROCEDURE — 1123F ACP DISCUSS/DSCN MKR DOCD: CPT | Performed by: INTERNAL MEDICINE

## 2024-04-15 NOTE — PROGRESS NOTES
for 51.6 years (51.6 ttl pk-yrs)     Types: Cigarettes     Start date: 9/9/1972    Smokeless tobacco: Former    Tobacco comments:     Having trouble quitting 3/19/24   Vaping Use    Vaping Use: Never used   Substance and Sexual Activity    Alcohol use: Yes     Comment: OCC    Drug use: No    Sexual activity: Not on file   Other Topics Concern    Not on file   Social History Narrative    Not on file     Social Determinants of Health     Financial Resource Strain: Not on file   Food Insecurity: Not on file   Transportation Needs: Not on file   Physical Activity: Not on file   Stress: Not on file   Social Connections: Not on file   Intimate Partner Violence: Not on file   Housing Stability: Not on file       Current Outpatient Medications   Medication Sig Dispense Refill    lisinopril (PRINIVIL;ZESTRIL) 5 MG tablet take 1 tablet by mouth once daily 90 tablet 3    metoprolol succinate (TOPROL XL) 50 MG extended release tablet Take 1.5 tablets by mouth 2 times daily 270 tablet 3    Misc. Devices (CPAP MACHINE) MISC by Does not apply route      aspirin 325 MG EC tablet Take 1 tablet by mouth daily 90 tablet 3     No current facility-administered medications for this visit.       Review of Systems -     General ROS: negative  Psychological ROS: negative  Hematological and Lymphatic ROS: No history of blood clots or bleeding disorder.   Respiratory ROS: no cough, shortness of breath, or wheezing  Cardiovascular ROS: no chest pain or dyspnea on exertion  Gastrointestinal ROS: negative  Genito-Urinary ROS: no dysuria, trouble voiding, or hematuria  Musculoskeletal ROS: negative  Neurological ROS: no TIA or stroke symptoms  Dermatological ROS: negative      Blood pressure (!) 142/90, pulse 68, height 1.905 m (6' 3\"), weight (!) 145.2 kg (320 lb).        Physical Examination:    General appearance - alert, well appearing, and in no distress  Mental status - alert, oriented to person, place, and time  Neck - supple, no

## 2024-10-07 RX ORDER — LISINOPRIL 5 MG/1
TABLET ORAL
Qty: 90 TABLET | Refills: 2 | Status: SHIPPED | OUTPATIENT
Start: 2024-10-07

## 2024-10-07 RX ORDER — METOPROLOL SUCCINATE 50 MG/1
75 TABLET, EXTENDED RELEASE ORAL 2 TIMES DAILY
Qty: 270 TABLET | Refills: 2 | Status: SHIPPED | OUTPATIENT
Start: 2024-10-07

## 2025-04-11 RX ORDER — LISINOPRIL 5 MG/1
TABLET ORAL
Qty: 90 TABLET | Refills: 0 | Status: SHIPPED | OUTPATIENT
Start: 2025-04-11

## 2025-04-14 ENCOUNTER — TELEPHONE (OUTPATIENT)
Dept: CARDIOLOGY CLINIC | Age: 70
End: 2025-04-14

## 2025-04-14 ENCOUNTER — HOSPITAL ENCOUNTER (OUTPATIENT)
Dept: OTHER | Age: 70
Discharge: HOME | End: 2025-04-14
Payer: MEDICARE

## 2025-04-14 ENCOUNTER — OFFICE VISIT (OUTPATIENT)
Dept: CARDIOLOGY CLINIC | Age: 70
End: 2025-04-14
Payer: MEDICARE

## 2025-04-14 VITALS
WEIGHT: 315 LBS | HEART RATE: 60 BPM | BODY MASS INDEX: 39.17 KG/M2 | DIASTOLIC BLOOD PRESSURE: 87 MMHG | HEIGHT: 75 IN | SYSTOLIC BLOOD PRESSURE: 139 MMHG

## 2025-04-14 DIAGNOSIS — E66.01 MORBID OBESITY DUE TO EXCESS CALORIES: ICD-10-CM

## 2025-04-14 DIAGNOSIS — I48.0 PAF (PAROXYSMAL ATRIAL FIBRILLATION) (HCC): ICD-10-CM

## 2025-04-14 DIAGNOSIS — Z01.818 PRE-OP EVALUATION: ICD-10-CM

## 2025-04-14 DIAGNOSIS — Z86.79 S/P ABLATION OF ATRIAL FIBRILLATION: ICD-10-CM

## 2025-04-14 DIAGNOSIS — R06.09 DOE (DYSPNEA ON EXERTION): ICD-10-CM

## 2025-04-14 DIAGNOSIS — I42.0 DILATED CARDIOMYOPATHY (HCC): Primary | ICD-10-CM

## 2025-04-14 DIAGNOSIS — Z86.79 S/P RF ABLATION OPERATION FOR ARRHYTHMIA: ICD-10-CM

## 2025-04-14 DIAGNOSIS — I42.0 DILATED CARDIOMYOPATHY (HCC): ICD-10-CM

## 2025-04-14 DIAGNOSIS — I10 ESSENTIAL HYPERTENSION: ICD-10-CM

## 2025-04-14 DIAGNOSIS — Z98.890 S/P ABLATION OF ATRIAL FIBRILLATION: ICD-10-CM

## 2025-04-14 DIAGNOSIS — Z98.890 S/P RF ABLATION OPERATION FOR ARRHYTHMIA: ICD-10-CM

## 2025-04-14 LAB
ALBUMIN SERPL BCG-MCNC: 3.9 G/DL (ref 3.4–4.9)
ALP SERPL-CCNC: 92 U/L (ref 40–129)
ALT SERPL W/O P-5'-P-CCNC: 33 U/L (ref 10–50)
ANION GAP SERPL CALC-SCNC: 9 MEQ/L (ref 8–16)
AST SERPL-CCNC: 23 U/L (ref 10–50)
BASOPHILS ABSOLUTE: 0.1 THOU/MM3 (ref 0–0.1)
BASOPHILS NFR BLD AUTO: 0.6 %
BILIRUB CONJ SERPL-MCNC: 0.2 MG/DL (ref 0–0.2)
BILIRUB SERPL-MCNC: 0.5 MG/DL (ref 0.3–1.2)
BUN SERPL-MCNC: 15 MG/DL (ref 8–23)
CALCIUM SERPL-MCNC: 9.5 MG/DL (ref 8.8–10.2)
CHLORIDE SERPL-SCNC: 102 MEQ/L (ref 98–111)
CHOLEST SERPL-MCNC: 197 MG/DL (ref 100–199)
CO2 SERPL-SCNC: 26 MEQ/L (ref 22–29)
CREAT SERPL-MCNC: 1 MG/DL (ref 0.7–1.2)
DEPRECATED RDW RBC AUTO: 44.2 FL (ref 35–45)
EOSINOPHIL NFR BLD AUTO: 2.6 %
EOSINOPHILS ABSOLUTE: 0.2 THOU/MM3 (ref 0–0.4)
ERYTHROCYTE [DISTWIDTH] IN BLOOD BY AUTOMATED COUNT: 13.3 % (ref 11.5–14.5)
GFR SERPL CREATININE-BSD FRML MDRD: 82 ML/MIN/1.73M2
GLUCOSE SERPL-MCNC: 89 MG/DL (ref 74–109)
HCT VFR BLD AUTO: 46.5 % (ref 42–52)
HDLC SERPL-MCNC: 30 MG/DL
HGB BLD-MCNC: 15.6 GM/DL (ref 14–18)
IMM GRANULOCYTES # BLD AUTO: 0.07 THOU/MM3 (ref 0–0.07)
IMM GRANULOCYTES NFR BLD AUTO: 0.8 %
LDLC SERPL CALC-MCNC: 136 MG/DL
LYMPHOCYTES ABSOLUTE: 1.7 THOU/MM3 (ref 1–4.8)
LYMPHOCYTES NFR BLD AUTO: 19.9 %
MAGNESIUM SERPL-MCNC: 2.1 MG/DL (ref 1.6–2.6)
MCH RBC QN AUTO: 30.5 PG (ref 26–33)
MCHC RBC AUTO-ENTMCNC: 33.5 GM/DL (ref 32.2–35.5)
MCV RBC AUTO: 90.8 FL (ref 80–94)
MONOCYTES ABSOLUTE: 0.5 THOU/MM3 (ref 0.4–1.3)
MONOCYTES NFR BLD AUTO: 6 %
NEUTROPHILS ABSOLUTE: 6 THOU/MM3 (ref 1.8–7.7)
NEUTROPHILS NFR BLD AUTO: 70.1 %
NRBC BLD AUTO-RTO: 0 /100 WBC
PLATELET # BLD AUTO: 177 THOU/MM3 (ref 130–400)
PMV BLD AUTO: 11.7 FL (ref 9.4–12.4)
POTASSIUM SERPL-SCNC: 4.7 MEQ/L (ref 3.5–5.2)
PROT SERPL-MCNC: 7.1 G/DL (ref 6.4–8.3)
RBC # BLD AUTO: 5.12 MILL/MM3 (ref 4.7–6.1)
SODIUM SERPL-SCNC: 137 MEQ/L (ref 135–145)
TRIGL SERPL-MCNC: 153 MG/DL (ref 0–199)
WBC # BLD AUTO: 8.5 THOU/MM3 (ref 4.8–10.8)

## 2025-04-14 PROCEDURE — 85025 COMPLETE CBC W/AUTO DIFF WBC: CPT

## 2025-04-14 PROCEDURE — 1036F TOBACCO NON-USER: CPT | Performed by: INTERNAL MEDICINE

## 2025-04-14 PROCEDURE — 99214 OFFICE O/P EST MOD 30 MIN: CPT | Performed by: INTERNAL MEDICINE

## 2025-04-14 PROCEDURE — 3079F DIAST BP 80-89 MM HG: CPT | Performed by: INTERNAL MEDICINE

## 2025-04-14 PROCEDURE — 3075F SYST BP GE 130 - 139MM HG: CPT | Performed by: INTERNAL MEDICINE

## 2025-04-14 PROCEDURE — 93000 ELECTROCARDIOGRAM COMPLETE: CPT | Performed by: INTERNAL MEDICINE

## 2025-04-14 PROCEDURE — 80053 COMPREHEN METABOLIC PANEL: CPT

## 2025-04-14 PROCEDURE — G8417 CALC BMI ABV UP PARAM F/U: HCPCS | Performed by: INTERNAL MEDICINE

## 2025-04-14 PROCEDURE — 83735 ASSAY OF MAGNESIUM: CPT

## 2025-04-14 PROCEDURE — 82248 BILIRUBIN DIRECT: CPT

## 2025-04-14 PROCEDURE — 80061 LIPID PANEL: CPT

## 2025-04-14 PROCEDURE — 36415 COLL VENOUS BLD VENIPUNCTURE: CPT

## 2025-04-14 PROCEDURE — 3017F COLORECTAL CA SCREEN DOC REV: CPT | Performed by: INTERNAL MEDICINE

## 2025-04-14 PROCEDURE — 1159F MED LIST DOCD IN RCRD: CPT | Performed by: INTERNAL MEDICINE

## 2025-04-14 PROCEDURE — G8427 DOCREV CUR MEDS BY ELIG CLIN: HCPCS | Performed by: INTERNAL MEDICINE

## 2025-04-14 PROCEDURE — 1160F RVW MEDS BY RX/DR IN RCRD: CPT | Performed by: INTERNAL MEDICINE

## 2025-04-14 PROCEDURE — 1123F ACP DISCUSS/DSCN MKR DOCD: CPT | Performed by: INTERNAL MEDICINE

## 2025-04-14 NOTE — PROGRESS NOTES
Chief Complaint   Patient presents with    1 Year Follow Up       Originally Came in for afib with RVR  Had successful TEECV today but few hours later went back to afib with CVR    Then went back to afib while in hospital  We started amiodarone for 1 week and then we did Cardioversion and get to NSR          1 year follow up    EKG completed today  Need knee surgery pre op eval  Limited activity due to knee problem  BALBUENA    Pt recently quit smoking.    Sob on exertion    Gained 6 lb in 1 yrs    Denies chest pain, dizziness, sob, edema  and, palpitations     Had  Ablation with Dr. Ray on 7-1-16.    Patient Seen, Chart, Consults notes, Labs, Radiology studies reviewed.          Patient Active Problem List   Diagnosis    DJD (degenerative joint disease) of knee    Atrial fibrillation with rapid ventricular response (HCC)    Persistent atrial fibrillation (HCC) s/p two CV and following successful CV pat aways went back to afib    Cardiomyopathy (HCC) EF 40% and repeat echo 2/2017- EF 55%    Essential hypertension    S/P RF ablation operation for arrhythmia - atrial Fib July 1, 2016 successful    S/P ablation of atrial fibrillation    MARGE on CPAP    Obesity (BMI 30-39.9)    Tobacco abuse    BALBUENA (dyspnea on exertion)    Morbid obesity due to excess calories    PAF (paroxysmal atrial fibrillation) (HCC)    Pre-op evaluation for knee replacement       Past Surgical History:   Procedure Laterality Date    ATRIAL ABLATION SURGERY  07/01/2016    ablation with Dr. Barkley on 7-1-16.    JOINT REPLACEMENT Right 2014    KNEE    KNEE ARTHROSCOPY Right 1980s    WRIST SURGERY Left 1980s       No Known Allergies     Family History   Problem Relation Age of Onset    Cancer Mother         lung    Cancer Father         unknown    Heart Disease Brother         Social History     Socioeconomic History    Marital status:      Spouse name: Not on file    Number of children: Not on file    Years of education: Not on file    Highest

## 2025-04-15 ENCOUNTER — TELEPHONE (OUTPATIENT)
Dept: CARDIOLOGY CLINIC | Age: 70
End: 2025-04-15

## 2025-04-17 ENCOUNTER — TELEPHONE (OUTPATIENT)
Dept: CARDIOLOGY CLINIC | Age: 70
End: 2025-04-17

## 2025-04-17 DIAGNOSIS — R06.09 DOE (DYSPNEA ON EXERTION): ICD-10-CM

## 2025-04-17 DIAGNOSIS — I48.0 PAF (PAROXYSMAL ATRIAL FIBRILLATION) (HCC): ICD-10-CM

## 2025-04-17 DIAGNOSIS — I42.0 DILATED CARDIOMYOPATHY (HCC): Primary | ICD-10-CM

## 2025-04-17 DIAGNOSIS — Z01.818 PRE-OP EVALUATION: ICD-10-CM

## 2025-04-17 NOTE — TELEPHONE ENCOUNTER
Wife returned call, stress test and echo scheduled 05-07-25  Date, time and instructions reviewed with wife and emailed

## 2025-04-17 NOTE — TELEPHONE ENCOUNTER
[facility] calling for pre op clearance for total knee  Last office visit 04/14/2025    Pulled from your note        Okay to order stress and ECHO??

## 2025-04-24 RX ORDER — METOPROLOL SUCCINATE 50 MG/1
TABLET, EXTENDED RELEASE ORAL
Qty: 270 TABLET | Refills: 0 | Status: SHIPPED | OUTPATIENT
Start: 2025-04-24

## 2025-05-01 ENCOUNTER — OFFICE VISIT (OUTPATIENT)
Age: 70
End: 2025-05-01
Payer: MEDICARE

## 2025-05-01 VITALS
TEMPERATURE: 96.9 F | HEART RATE: 63 BPM | OXYGEN SATURATION: 97 % | BODY MASS INDEX: 39.17 KG/M2 | HEIGHT: 75 IN | DIASTOLIC BLOOD PRESSURE: 72 MMHG | SYSTOLIC BLOOD PRESSURE: 118 MMHG | WEIGHT: 315 LBS

## 2025-05-01 DIAGNOSIS — E66.01 MORBID OBESITY (HCC): ICD-10-CM

## 2025-05-01 DIAGNOSIS — G47.33 OSA ON CPAP: Primary | ICD-10-CM

## 2025-05-01 PROCEDURE — 3017F COLORECTAL CA SCREEN DOC REV: CPT

## 2025-05-01 PROCEDURE — 3078F DIAST BP <80 MM HG: CPT

## 2025-05-01 PROCEDURE — 1160F RVW MEDS BY RX/DR IN RCRD: CPT

## 2025-05-01 PROCEDURE — 99213 OFFICE O/P EST LOW 20 MIN: CPT

## 2025-05-01 PROCEDURE — 3074F SYST BP LT 130 MM HG: CPT

## 2025-05-01 PROCEDURE — G8417 CALC BMI ABV UP PARAM F/U: HCPCS

## 2025-05-01 PROCEDURE — 1036F TOBACCO NON-USER: CPT

## 2025-05-01 PROCEDURE — G8427 DOCREV CUR MEDS BY ELIG CLIN: HCPCS

## 2025-05-01 PROCEDURE — 1123F ACP DISCUSS/DSCN MKR DOCD: CPT

## 2025-05-01 PROCEDURE — 1159F MED LIST DOCD IN RCRD: CPT

## 2025-05-01 ASSESSMENT — ENCOUNTER SYMPTOMS
RHINORRHEA: 0
SORE THROAT: 0
COUGH: 1
SINUS PRESSURE: 1
SINUS PAIN: 0
WHEEZING: 0
SHORTNESS OF BREATH: 0
CHEST TIGHTNESS: 0

## 2025-05-01 NOTE — PROGRESS NOTES
[facility]     SEVERIANO JOHN, 70 y.o.   : 1955  Day of encounter: 2025   Previously seen by Dr. Alexandra, Clemencia Elias PA-C     Subjective   Severiano is here for 1 year follow up for MARGE.   Severiano presents to the sleep clinic today alone with no acute concerns or complaints.  Reports no significant change in daytime sleepiness or drowsiness in the last year, no change in sleep quality.  Reports she is slightly less active at this time due to needing a knee replacement, which is anticipated to be replaced in the near future by Dr. Stephenson at [facility].  Patient denies significant changes in his health within the last year.  Reports he generally likes to wear his mask a little more loose, states leaking never bothers him.  Reports he gets closer to 8 1/2 hours of sleep per night.  Otherwise denies any issues with the machine itself.  Denies chest pain, chest tightness, shortness of breath today.  States he is currently having some runny nose, postnasal drip, lingering cough otherwise.  SAQLI: 98  [place] Sleepiness Scale: 0  Progress History:   Since last visit any new medical issues? No planning left knee replacement in the near future  New ER or hospitlal visits? No  Any new or changes in medicines? No     Initial AHI: 19.2 per study dated 2003     Past Medical hx   PMH:  Past Medical History:   Diagnosis Date    Atrial fibrillation with RVR ([facility]) 2/15/15    Successful cardioversion on 16, reverted back to A. fib after stress test    Chronic systolic congestive heart failure (HCC) 2/15/15     Ejection fraction of 40%    DJD (degenerative joint disease) of knee     Essential hypertension     Fracture of finger, middle phalanx, left, open 2/15/15    Finger got stuck between the log and door    Sleep apnea      SURGICAL HISTORY:  Past Surgical History:   Procedure Laterality Date    ATRIAL ABLATION SURGERY  2016    ablation with Dr. Barkley on 16.

## 2025-05-02 ENCOUNTER — HOSPITAL ENCOUNTER (OUTPATIENT)
Dept: NUCLEAR MEDICINE | Age: 70
End: 2025-05-02
Attending: INTERNAL MEDICINE
Payer: MEDICARE

## 2025-05-02 ENCOUNTER — HOSPITAL ENCOUNTER (OUTPATIENT)
Age: 70
End: 2025-05-02
Attending: INTERNAL MEDICINE
Payer: MEDICARE

## 2025-05-02 ENCOUNTER — HOSPITAL ENCOUNTER (OUTPATIENT)
Dept: NUCLEAR MEDICINE | Age: 70
Discharge: HOME | End: 2025-05-02
Attending: INTERNAL MEDICINE
Payer: MEDICARE

## 2025-05-02 ENCOUNTER — HOSPITAL ENCOUNTER (OUTPATIENT)
Age: 70
Discharge: HOME | End: 2025-05-02
Attending: INTERNAL MEDICINE
Payer: MEDICARE

## 2025-05-02 DIAGNOSIS — I48.0 PAF (PAROXYSMAL ATRIAL FIBRILLATION) (HCC): ICD-10-CM

## 2025-05-02 DIAGNOSIS — Z01.818 PRE-OP EVALUATION: ICD-10-CM

## 2025-05-02 DIAGNOSIS — R06.09 DOE (DYSPNEA ON EXERTION): ICD-10-CM

## 2025-05-02 DIAGNOSIS — I42.0 DILATED CARDIOMYOPATHY (HCC): ICD-10-CM

## 2025-05-02 LAB
ECHO AO ASC DIAM: 3.7 CM
ECHO AV CUSP MM: 1.7 CM
ECHO AV MEAN GRADIENT: 6 MMHG
ECHO AV MEAN VELOCITY: 1.1 M/S
ECHO AV PEAK GRADIENT: 11 MMHG
ECHO AV PEAK VELOCITY: 1.7 M/S
ECHO AV VELOCITY RATIO: 0.82
ECHO AV VTI: 35.7 CM
ECHO EST RA PRESSURE: 3 MMHG
ECHO LA AREA 2C: 25 CM2
ECHO LA AREA 4C: 26.7 CM2
ECHO LA DIAMETER: 5.2 CM
ECHO LA MAJOR AXIS: 7.4 CM
ECHO LA MINOR AXIS: 6.2 CM
ECHO LA VOL BP: 87 ML (ref 18–58)
ECHO LA VOL MOD A2C: 83 ML (ref 18–58)
ECHO LA VOL MOD A4C: 77 ML (ref 18–58)
ECHO LV E' LATERAL VELOCITY: 8.1 CM/S
ECHO LV E' SEPTAL VELOCITY: 4.5 CM/S
ECHO LV EDV A2C: 151 ML
ECHO LV EDV A4C: 129 ML
ECHO LV EF PHYSICIAN: 60 %
ECHO LV EJECTION FRACTION A2C: 62 %
ECHO LV EJECTION FRACTION A4C: 60 %
ECHO LV EJECTION FRACTION BIPLANE: 61 % (ref 55–100)
ECHO LV ESV A2C: 57 ML
ECHO LV ESV A4C: 51 ML
ECHO LV FRACTIONAL SHORTENING: 31 % (ref 28–44)
ECHO LV INTERNAL DIMENSION DIASTOLIC: 6.1 CM (ref 4.2–5.9)
ECHO LV INTERNAL DIMENSION SYSTOLIC: 4.2 CM
ECHO LV ISOVOLUMETRIC RELAXATION TIME (IVRT): 77 MS
ECHO LV IVSD: 0.8 CM (ref 0.6–1)
ECHO LV MASS 2D: 206.6 G (ref 88–224)
ECHO LV POSTERIOR WALL DIASTOLIC: 0.9 CM (ref 0.6–1)
ECHO LV RELATIVE WALL THICKNESS RATIO: 0.3
ECHO LVOT AV VTI INDEX: 0.84
ECHO LVOT MEAN GRADIENT: 4 MMHG
ECHO LVOT PEAK GRADIENT: 8 MMHG
ECHO LVOT PEAK VELOCITY: 1.4 M/S
ECHO LVOT VTI: 30.1 CM
ECHO MV A VELOCITY: 0.53 M/S
ECHO MV E DECELERATION TIME (DT): 244 MS
ECHO MV E VELOCITY: 1.14 M/S
ECHO MV E/A RATIO: 2.15
ECHO MV E/E' LATERAL: 14.07
ECHO MV E/E' RATIO (AVERAGED): 19.7
ECHO MV E/E' SEPTAL: 25.33
ECHO PV MAX VELOCITY: 0.5 M/S
ECHO PV PEAK GRADIENT: 1 MMHG
ECHO RIGHT VENTRICULAR SYSTOLIC PRESSURE (RVSP): 23 MMHG
ECHO RV INTERNAL DIMENSION: 3.7 CM
ECHO RV TAPSE: 2.6 CM (ref 1.7–?)
ECHO TV E WAVE: 0.6 M/S
ECHO TV REGURGITANT MAX VELOCITY: 2.25 M/S
ECHO TV REGURGITANT PEAK GRADIENT: 20 MMHG

## 2025-05-02 PROCEDURE — 93306 TTE W/DOPPLER COMPLETE: CPT

## 2025-05-02 PROCEDURE — 93306 TTE W/DOPPLER COMPLETE: CPT | Performed by: INTERNAL MEDICINE

## 2025-05-05 ENCOUNTER — HOSPITAL ENCOUNTER (OUTPATIENT)
Dept: NUCLEAR MEDICINE | Age: 70
Discharge: HOME | End: 2025-05-05
Attending: INTERNAL MEDICINE
Payer: MEDICARE

## 2025-05-05 ENCOUNTER — HOSPITAL ENCOUNTER (OUTPATIENT)
Age: 70
Discharge: HOME | End: 2025-05-07
Attending: INTERNAL MEDICINE
Payer: MEDICARE

## 2025-05-05 VITALS — HEIGHT: 75 IN | WEIGHT: 315 LBS | BODY MASS INDEX: 39.17 KG/M2

## 2025-05-05 LAB
ECHO BSA: 2.81 M2
NUC STRESS EJECTION FRACTION: 62 %
STRESS BASELINE DIAS BP: 77 MMHG
STRESS BASELINE HR: 57 BPM
STRESS BASELINE ST DEPRESSION: 0 MM
STRESS BASELINE SYS BP: 135 MMHG
STRESS ESTIMATED WORKLOAD: 1 METS
STRESS PEAK DIAS BP: 78 MMHG
STRESS PEAK SYS BP: 156 MMHG
STRESS PERCENT HR ACHIEVED: 51 %
STRESS POST PEAK HR: 77 BPM
STRESS RATE PRESSURE PRODUCT: NORMAL BPM*MMHG
STRESS ST DEPRESSION: 0 MM
STRESS STAGE 1 BP: NORMAL MMHG
STRESS STAGE 1 DURATION: 1 MIN:SEC
STRESS STAGE 1 HR: 64 BPM
STRESS STAGE 2 BP: NORMAL MMHG
STRESS STAGE 2 DURATION: 1 MIN:SEC
STRESS STAGE 2 HR: 74 BPM
STRESS STAGE 3 BP: NORMAL MMHG
STRESS STAGE 3 DURATION: 1 MIN:SEC
STRESS STAGE 3 HR: 72 BPM
STRESS STAGE RECOVERY 1 BP: NORMAL MMHG
STRESS STAGE RECOVERY 1 DURATION: 1 MIN:SEC
STRESS STAGE RECOVERY 1 HR: 69 BPM
STRESS STAGE RECOVERY 2 BP: NORMAL MMHG
STRESS STAGE RECOVERY 2 DURATION: 1 MIN:SEC
STRESS STAGE RECOVERY 2 HR: 69 BPM
STRESS STAGE RECOVERY 3 BP: NORMAL MMHG
STRESS STAGE RECOVERY 3 DURATION: 1 MIN:SEC
STRESS STAGE RECOVERY 3 HR: 68 BPM
STRESS STAGE RECOVERY 4 BP: NORMAL MMHG
STRESS STAGE RECOVERY 4 DURATION: 1 MIN:SEC
STRESS STAGE RECOVERY 4 HR: 68 BPM
STRESS TARGET HR: 152 BPM
TID: 1.14

## 2025-05-05 PROCEDURE — 78452 HT MUSCLE IMAGE SPECT MULT: CPT

## 2025-05-05 PROCEDURE — 93017 CV STRESS TEST TRACING ONLY: CPT

## 2025-05-05 PROCEDURE — 3430000000 HC RX DIAGNOSTIC RADIOPHARMACEUTICAL: Performed by: INTERNAL MEDICINE

## 2025-05-05 PROCEDURE — 78452 HT MUSCLE IMAGE SPECT MULT: CPT | Performed by: INTERNAL MEDICINE

## 2025-05-05 PROCEDURE — A9500 TC99M SESTAMIBI: HCPCS | Performed by: INTERNAL MEDICINE

## 2025-05-05 PROCEDURE — 6360000002 HC RX W HCPCS: Performed by: INTERNAL MEDICINE

## 2025-05-05 PROCEDURE — 93018 CV STRESS TEST I&R ONLY: CPT | Performed by: INTERNAL MEDICINE

## 2025-05-05 PROCEDURE — 93016 CV STRESS TEST SUPVJ ONLY: CPT | Performed by: INTERNAL MEDICINE

## 2025-05-05 RX ORDER — TETRAKIS(2-METHOXYISOBUTYLISOCYANIDE)COPPER(I) TETRAFLUOROBORATE 1 MG/ML
35 INJECTION, POWDER, LYOPHILIZED, FOR SOLUTION INTRAVENOUS
Status: COMPLETED | OUTPATIENT
Start: 2025-05-05 | End: 2025-05-05

## 2025-05-05 RX ORDER — TETRAKIS(2-METHOXYISOBUTYLISOCYANIDE)COPPER(I) TETRAFLUOROBORATE 1 MG/ML
10.8 INJECTION, POWDER, LYOPHILIZED, FOR SOLUTION INTRAVENOUS
Status: COMPLETED | OUTPATIENT
Start: 2025-05-05 | End: 2025-05-05

## 2025-05-05 RX ORDER — REGADENOSON 0.08 MG/ML
0.4 INJECTION, SOLUTION INTRAVENOUS
Status: COMPLETED | OUTPATIENT
Start: 2025-05-05 | End: 2025-05-05

## 2025-05-05 RX ADMIN — Medication 35 MILLICURIE: at 08:40

## 2025-05-05 RX ADMIN — Medication 10.8 MILLICURIE: at 07:45

## 2025-05-05 RX ADMIN — REGADENOSON 0.4 MG: 0.08 INJECTION, SOLUTION INTRAVENOUS at 08:41

## 2025-05-14 PROBLEM — Z01.818 PRE-OP EVALUATION: Status: RESOLVED | Noted: 2025-04-14 | Resolved: 2025-05-14

## 2025-07-14 RX ORDER — LISINOPRIL 5 MG/1
5 TABLET ORAL DAILY
Qty: 90 TABLET | Refills: 3 | Status: SHIPPED | OUTPATIENT
Start: 2025-07-14